# Patient Record
Sex: FEMALE | Race: WHITE | Employment: UNEMPLOYED | URBAN - METROPOLITAN AREA
[De-identification: names, ages, dates, MRNs, and addresses within clinical notes are randomized per-mention and may not be internally consistent; named-entity substitution may affect disease eponyms.]

---

## 2022-11-24 ENCOUNTER — APPOINTMENT (OUTPATIENT)
Dept: CT IMAGING | Age: 82
DRG: 063 | End: 2022-11-24
Attending: EMERGENCY MEDICINE
Payer: MEDICARE

## 2022-11-24 ENCOUNTER — HOSPITAL ENCOUNTER (INPATIENT)
Age: 82
LOS: 3 days | Discharge: HOME | DRG: 063 | End: 2022-11-27
Attending: EMERGENCY MEDICINE | Admitting: INTERNAL MEDICINE
Payer: MEDICARE

## 2022-11-24 DIAGNOSIS — E78.5 DYSLIPIDEMIA: ICD-10-CM

## 2022-11-24 DIAGNOSIS — R29.898 ARM WEAKNESS: Primary | ICD-10-CM

## 2022-11-24 DIAGNOSIS — I63.312 CEREBROVASCULAR ACCIDENT (CVA) DUE TO THROMBOSIS OF LEFT MIDDLE CEREBRAL ARTERY (HCC): ICD-10-CM

## 2022-11-24 PROBLEM — I63.9 STROKE (HCC): Status: ACTIVE | Noted: 2022-11-24

## 2022-11-24 LAB
ALBUMIN SERPL-MCNC: 3.9 G/DL (ref 3.5–5)
ALBUMIN/GLOB SERPL: 1.1 {RATIO} (ref 1.1–2.2)
ALP SERPL-CCNC: 92 U/L (ref 45–117)
ALT SERPL-CCNC: 25 U/L (ref 12–78)
AMPHET UR QL SCN: NEGATIVE
ANION GAP SERPL CALC-SCNC: 7 MMOL/L (ref 5–15)
APPEARANCE UR: CLEAR
AST SERPL-CCNC: 26 U/L (ref 15–37)
BACTERIA URNS QL MICRO: NEGATIVE /HPF
BARBITURATES UR QL SCN: NEGATIVE
BASOPHILS # BLD: 0 K/UL (ref 0–0.1)
BASOPHILS NFR BLD: 0 % (ref 0–1)
BENZODIAZ UR QL: NEGATIVE
BILIRUB SERPL-MCNC: 0.3 MG/DL (ref 0.2–1)
BILIRUB UR QL: NEGATIVE
BUN SERPL-MCNC: 16 MG/DL (ref 6–20)
BUN/CREAT SERPL: 18 (ref 12–20)
CALCIUM SERPL-MCNC: 9.9 MG/DL (ref 8.5–10.1)
CANNABINOIDS UR QL SCN: NEGATIVE
CHLORIDE SERPL-SCNC: 100 MMOL/L (ref 97–108)
CO2 SERPL-SCNC: 28 MMOL/L (ref 21–32)
COCAINE UR QL SCN: NEGATIVE
COLOR UR: ABNORMAL
COMMENT, HOLDF: NORMAL
CREAT SERPL-MCNC: 0.88 MG/DL (ref 0.55–1.02)
DIFFERENTIAL METHOD BLD: NORMAL
DRUG SCRN COMMENT,DRGCM: NORMAL
EOSINOPHIL # BLD: 0.2 K/UL (ref 0–0.4)
EOSINOPHIL NFR BLD: 2 % (ref 0–7)
EPITH CASTS URNS QL MICRO: ABNORMAL /LPF
ERYTHROCYTE [DISTWIDTH] IN BLOOD BY AUTOMATED COUNT: 12.2 % (ref 11.5–14.5)
ETHANOL SERPL-MCNC: <10 MG/DL
GLOBULIN SER CALC-MCNC: 3.6 G/DL (ref 2–4)
GLUCOSE BLD STRIP.AUTO-MCNC: 113 MG/DL (ref 65–117)
GLUCOSE SERPL-MCNC: 98 MG/DL (ref 65–100)
GLUCOSE UR STRIP.AUTO-MCNC: NEGATIVE MG/DL
HCT VFR BLD AUTO: 38.7 % (ref 35–47)
HGB BLD-MCNC: 13.3 G/DL (ref 11.5–16)
HGB UR QL STRIP: NEGATIVE
HYALINE CASTS URNS QL MICRO: ABNORMAL /LPF (ref 0–2)
IMM GRANULOCYTES # BLD AUTO: 0 K/UL (ref 0–0.04)
IMM GRANULOCYTES NFR BLD AUTO: 0 % (ref 0–0.5)
INR PPP: 0.9 (ref 0.9–1.1)
KETONES UR QL STRIP.AUTO: NEGATIVE MG/DL
LEUKOCYTE ESTERASE UR QL STRIP.AUTO: ABNORMAL
LYMPHOCYTES # BLD: 3.3 K/UL (ref 0.8–3.5)
LYMPHOCYTES NFR BLD: 44 % (ref 12–49)
MCH RBC QN AUTO: 31.7 PG (ref 26–34)
MCHC RBC AUTO-ENTMCNC: 34.4 G/DL (ref 30–36.5)
MCV RBC AUTO: 92.1 FL (ref 80–99)
METHADONE UR QL: NEGATIVE
MONOCYTES # BLD: 0.8 K/UL (ref 0–1)
MONOCYTES NFR BLD: 10 % (ref 5–13)
NEUTS SEG # BLD: 3.3 K/UL (ref 1.8–8)
NEUTS SEG NFR BLD: 44 % (ref 32–75)
NITRITE UR QL STRIP.AUTO: NEGATIVE
NRBC # BLD: 0 K/UL (ref 0–0.01)
NRBC BLD-RTO: 0 PER 100 WBC
OPIATES UR QL: NEGATIVE
PCP UR QL: NEGATIVE
PH UR STRIP: 6.5 [PH] (ref 5–8)
PLATELET # BLD AUTO: 304 K/UL (ref 150–400)
PMV BLD AUTO: 9.9 FL (ref 8.9–12.9)
POTASSIUM SERPL-SCNC: 3.4 MMOL/L (ref 3.5–5.1)
PROT SERPL-MCNC: 7.5 G/DL (ref 6.4–8.2)
PROT UR STRIP-MCNC: NEGATIVE MG/DL
PROTHROMBIN TIME: 9.8 SEC (ref 9–11.1)
RBC # BLD AUTO: 4.2 M/UL (ref 3.8–5.2)
RBC #/AREA URNS HPF: ABNORMAL /HPF (ref 0–5)
SAMPLES BEING HELD,HOLD: NORMAL
SERVICE CMNT-IMP: NORMAL
SODIUM SERPL-SCNC: 135 MMOL/L (ref 136–145)
SP GR UR REFRACTOMETRY: 1.02
TSH SERPL DL<=0.05 MIU/L-ACNC: 21.7 UIU/ML (ref 0.36–3.74)
UA: UC IF INDICATED,UAUC: ABNORMAL
UROBILINOGEN UR QL STRIP.AUTO: 0.2 EU/DL (ref 0.2–1)
WBC # BLD AUTO: 7.5 K/UL (ref 3.6–11)
WBC URNS QL MICRO: ABNORMAL /HPF (ref 0–4)

## 2022-11-24 PROCEDURE — 96374 THER/PROPH/DIAG INJ IV PUSH: CPT

## 2022-11-24 PROCEDURE — 70496 CT ANGIOGRAPHY HEAD: CPT

## 2022-11-24 PROCEDURE — 74011250636 HC RX REV CODE- 250/636: Performed by: NURSE PRACTITIONER

## 2022-11-24 PROCEDURE — 82962 GLUCOSE BLOOD TEST: CPT

## 2022-11-24 PROCEDURE — 74011250637 HC RX REV CODE- 250/637: Performed by: NURSE PRACTITIONER

## 2022-11-24 PROCEDURE — 81001 URINALYSIS AUTO W/SCOPE: CPT

## 2022-11-24 PROCEDURE — 74011000250 HC RX REV CODE- 250: Performed by: EMERGENCY MEDICINE

## 2022-11-24 PROCEDURE — 0042T CT CODE NEURO PERF W CBF: CPT

## 2022-11-24 PROCEDURE — 84443 ASSAY THYROID STIM HORMONE: CPT

## 2022-11-24 PROCEDURE — 80307 DRUG TEST PRSMV CHEM ANLYZR: CPT

## 2022-11-24 PROCEDURE — 99285 EMERGENCY DEPT VISIT HI MDM: CPT

## 2022-11-24 PROCEDURE — 85025 COMPLETE CBC W/AUTO DIFF WBC: CPT

## 2022-11-24 PROCEDURE — 36415 COLL VENOUS BLD VENIPUNCTURE: CPT

## 2022-11-24 PROCEDURE — 80053 COMPREHEN METABOLIC PANEL: CPT

## 2022-11-24 PROCEDURE — 82077 ASSAY SPEC XCP UR&BREATH IA: CPT

## 2022-11-24 PROCEDURE — 3E03317 INTRODUCTION OF OTHER THROMBOLYTIC INTO PERIPHERAL VEIN, PERCUTANEOUS APPROACH: ICD-10-PCS | Performed by: EMERGENCY MEDICINE

## 2022-11-24 PROCEDURE — 74011000636 HC RX REV CODE- 636: Performed by: EMERGENCY MEDICINE

## 2022-11-24 PROCEDURE — 70450 CT HEAD/BRAIN W/O DYE: CPT

## 2022-11-24 PROCEDURE — 85610 PROTHROMBIN TIME: CPT

## 2022-11-24 PROCEDURE — 4A03X5D MEASUREMENT OF ARTERIAL FLOW, INTRACRANIAL, EXTERNAL APPROACH: ICD-10-PCS | Performed by: RADIOLOGY

## 2022-11-24 PROCEDURE — 65610000006 HC RM INTENSIVE CARE

## 2022-11-24 PROCEDURE — 74011000250 HC RX REV CODE- 250

## 2022-11-24 PROCEDURE — 74011250636 HC RX REV CODE- 250/636

## 2022-11-24 PROCEDURE — 93005 ELECTROCARDIOGRAM TRACING: CPT

## 2022-11-24 RX ORDER — HEPARIN SODIUM 5000 [USP'U]/ML
5000 INJECTION, SOLUTION INTRAVENOUS; SUBCUTANEOUS EVERY 8 HOURS
Status: DISCONTINUED | OUTPATIENT
Start: 2022-11-24 | End: 2022-11-25

## 2022-11-24 RX ORDER — SODIUM CHLORIDE 0.9 % (FLUSH) 0.9 %
10 SYRINGE (ML) INJECTION ONCE
Status: COMPLETED | OUTPATIENT
Start: 2022-11-24 | End: 2022-11-24

## 2022-11-24 RX ORDER — LABETALOL HYDROCHLORIDE 5 MG/ML
INJECTION, SOLUTION INTRAVENOUS
Status: COMPLETED
Start: 2022-11-24 | End: 2022-11-24

## 2022-11-24 RX ORDER — LEVOTHYROXINE SODIUM 50 UG/1
50 TABLET ORAL
Status: DISCONTINUED | OUTPATIENT
Start: 2022-11-25 | End: 2022-11-26

## 2022-11-24 RX ORDER — METOPROLOL TARTRATE 5 MG/5ML
10 INJECTION INTRAVENOUS ONCE
Status: ACTIVE | OUTPATIENT
Start: 2022-11-24 | End: 2022-11-25

## 2022-11-24 RX ORDER — ACETAMINOPHEN 325 MG/1
650 TABLET ORAL
Status: DISCONTINUED | OUTPATIENT
Start: 2022-11-24 | End: 2022-11-27 | Stop reason: HOSPADM

## 2022-11-24 RX ORDER — GUAIFENESIN 100 MG/5ML
81 LIQUID (ML) ORAL DAILY
Status: DISCONTINUED | OUTPATIENT
Start: 2022-11-25 | End: 2022-11-27 | Stop reason: HOSPADM

## 2022-11-24 RX ORDER — LABETALOL HYDROCHLORIDE 5 MG/ML
10 INJECTION, SOLUTION INTRAVENOUS
Status: DISCONTINUED | OUTPATIENT
Start: 2022-11-24 | End: 2022-11-27 | Stop reason: HOSPADM

## 2022-11-24 RX ORDER — POTASSIUM CHLORIDE 7.45 MG/ML
10 INJECTION INTRAVENOUS
Status: COMPLETED | OUTPATIENT
Start: 2022-11-24 | End: 2022-11-24

## 2022-11-24 RX ORDER — LORAZEPAM 0.5 MG/1
0.5 TABLET ORAL ONCE
Status: COMPLETED | OUTPATIENT
Start: 2022-11-24 | End: 2022-11-24

## 2022-11-24 RX ADMIN — IOPAMIDOL 100 ML: 755 INJECTION, SOLUTION INTRAVENOUS at 18:36

## 2022-11-24 RX ADMIN — Medication 13 MG: at 18:21

## 2022-11-24 RX ADMIN — POTASSIUM CHLORIDE 10 MEQ: 10 INJECTION, SOLUTION INTRAVENOUS at 22:00

## 2022-11-24 RX ADMIN — POTASSIUM CHLORIDE 10 MEQ: 10 INJECTION, SOLUTION INTRAVENOUS at 23:00

## 2022-11-24 RX ADMIN — LORAZEPAM 0.5 MG: 0.5 TABLET ORAL at 22:46

## 2022-11-24 RX ADMIN — SODIUM CHLORIDE, PRESERVATIVE FREE 10 ML: 5 INJECTION INTRAVENOUS at 18:55

## 2022-11-24 RX ADMIN — LABETALOL HYDROCHLORIDE 10 MG: 5 INJECTION INTRAVENOUS at 18:17

## 2022-11-24 RX ADMIN — LABETALOL HYDROCHLORIDE: 5 INJECTION, SOLUTION INTRAVENOUS at 18:19

## 2022-11-24 RX ADMIN — SODIUM CHLORIDE, PRESERVATIVE FREE 10 ML: 5 INJECTION INTRAVENOUS at 18:54

## 2022-11-24 NOTE — ED PROVIDER NOTES
EMERGENCY DEPARTMENT HISTORY AND PHYSICAL EXAM      Date: 11/24/2022  Patient Name: Willie Valenzuela    History of Presenting Illness     Chief Complaint   Patient presents with    Hand Pain     Patient presents to triage ambulatory complaining of having a stroke. Patient reports she began to experience right hand pain while eating dinner. Patient is speaking in complete sentences, tongue is midline and speech is clear. Patient is using her right hand however reports it's numb and she is unable to feel her fingers. Patient used her right hand to remove her earrings from her ears and necklaces around her neck. Patient denies any headache, fever, chills or nausea/vomiting. History Provided By: Patient    HPI: Willie Valenzuela, 80 y.o. female with PMHx as noted below presents the emergency department for evaluation of right arm weakness. Patient is approximately 1 hour prior to arrival patient felt that her right hand was weaker. Patient notes that symptoms been constant since onset. Has no other symptoms or deficits. Denies any pain. Pt denies any other alleviating or exacerbating factors. Additionally, pt specifically denies any recent fever, chills, headache, nausea, vomiting, abdominal pain, CP, SOB, lightheadedness, dizziness, BLE swelling, heart palpitations, urinary sxs, diarrhea, constipation, melena, hematochezia, cough, or congestion.   PCP: None    Current Facility-Administered Medications   Medication Dose Route Frequency Provider Last Rate Last Admin    niCARdipine (CARDENE) 25 mg in 0.9% sodium chloride 250 mL (Aaof3Hal)  5-15 mg/hr IntraVENous TITRATE Eugenia Shin MD        labetaloL (NORMODYNE;TRANDATE) injection 10 mg  10 mg IntraVENous Q5MIN PRN Eugenia Shin MD   10 mg at 11/24/22 1817    metoprolol (LOPRESSOR) injection 10 mg  10 mg IntraVENous Chanelle Woo MD        labetaloL (NORMODYNE;TRANDATE) 5 mg/mL injection             acetaminophen (TYLENOL) tablet 650 mg  650 mg Oral Q4H PRN Ashok Mata NP        Or    acetaminophen (TYLENOL) solution 650 mg  650 mg Per NG tube Q4H PRN Ashok Mata NP        Or    acetaminophen (TYLENOL) suppository 650 mg  650 mg Rectal Q4H PRN Ashok Mata NP        [Held by provider] aspirin chewable tablet 81 mg  81 mg Oral DAILY Ashok Mata NP        [Held by provider] heparin (porcine) injection 5,000 Units  5,000 Units SubCUTAneous Q8H Ashok Mata NP        Tammie Ramirez ON 11/25/2022] levothyroxine (SYNTHROID) tablet 50 mcg  50 mcg Oral 6am Nicolas Larose, JORDY        potassium chloride 10 mEq in 100 ml IVPB  10 mEq IntraVENous Q1H Ashok Mata NP           Past History     Past Medical History:  htn    Past Surgical History:  No past surgical history on file. Family History:  No family history on file. Social History:  Alcohol socially, denies any illicit drugs    Allergies:  No Known Allergies      Review of Systems   Review of Systems  Constitutional: Negative for fever, chills, and fatigue. HENT: Negative for congestion, sore throat, rhinorrhea, sneezing and neck stiffness   Eyes: Negative for discharge and redness. Respiratory: Negative for  shortness of breath, wheezing   Cardiovascular: Negative for chest pain, palpitations   Gastrointestinal: Negative for nausea, vomiting, abdominal pain, constipation, diarrhea and blood in stool. Genitourinary: Negative for dysuria, urgency, frequency, hematuria, flank pain, decreased urine volume, discharge,   Musculoskeletal: Negative for myalgias or joint pain . Skin: Negative for rash or lesions . Neurological: Positive numbness, weakness. Negative headaches. Physical Exam   Physical Exam    GENERAL: alert and oriented, no acute distress  EYES: PEERL, No injection, discharge or icterus. ENT: Mucous membranes pink and moist.  NECK: Supple  LUNGS: Airway patent. Non-labored respirations.  Breath sounds clear with good air entry bilaterally. HEART: Regular rate and rhythm. No peripheral edema  ABDOMEN: Non-distended and non-tender, without guarding or rebound. SKIN:  warm, dry  EXTREMITIES: Without swelling, tenderness or deformity, symmetric with normal ROM  NEUROLOGICAL: Alert, oriented cranial nerves II through XII grossly intact, right upper extremity 4/5, left upper extremity 5/5 strength, bilateral lower extremities 5/5. Sensation intact and equal throughout to light touch. Diagnostic Study Results     Labs -     Recent Results (from the past 12 hour(s))   GLUCOSE, POC    Collection Time: 11/24/22  5:56 PM   Result Value Ref Range    Glucose (POC) 113 65 - 117 mg/dL    Performed by Luisito Felipe    CBC WITH AUTOMATED DIFF    Collection Time: 11/24/22  6:25 PM   Result Value Ref Range    WBC 7.5 3.6 - 11.0 K/uL    RBC 4.20 3.80 - 5.20 M/uL    HGB 13.3 11.5 - 16.0 g/dL    HCT 38.7 35.0 - 47.0 %    MCV 92.1 80.0 - 99.0 FL    MCH 31.7 26.0 - 34.0 PG    MCHC 34.4 30.0 - 36.5 g/dL    RDW 12.2 11.5 - 14.5 %    PLATELET 076 317 - 484 K/uL    MPV 9.9 8.9 - 12.9 FL    NRBC 0.0 0  WBC    ABSOLUTE NRBC 0.00 0.00 - 0.01 K/uL    NEUTROPHILS 44 32 - 75 %    LYMPHOCYTES 44 12 - 49 %    MONOCYTES 10 5 - 13 %    EOSINOPHILS 2 0 - 7 %    BASOPHILS 0 0 - 1 %    IMMATURE GRANULOCYTES 0 0.0 - 0.5 %    ABS. NEUTROPHILS 3.3 1.8 - 8.0 K/UL    ABS. LYMPHOCYTES 3.3 0.8 - 3.5 K/UL    ABS. MONOCYTES 0.8 0.0 - 1.0 K/UL    ABS. EOSINOPHILS 0.2 0.0 - 0.4 K/UL    ABS. BASOPHILS 0.0 0.0 - 0.1 K/UL    ABS. IMM.  GRANS. 0.0 0.00 - 0.04 K/UL    DF AUTOMATED     METABOLIC PANEL, COMPREHENSIVE    Collection Time: 11/24/22  6:25 PM   Result Value Ref Range    Sodium 135 (L) 136 - 145 mmol/L    Potassium 3.4 (L) 3.5 - 5.1 mmol/L    Chloride 100 97 - 108 mmol/L    CO2 28 21 - 32 mmol/L    Anion gap 7 5 - 15 mmol/L    Glucose 98 65 - 100 mg/dL    BUN 16 6 - 20 MG/DL    Creatinine 0.88 0.55 - 1.02 MG/DL    BUN/Creatinine ratio 18 12 - 20      eGFR >60 >60 ml/min/1.73m2    Calcium 9.9 8.5 - 10.1 MG/DL    Bilirubin, total 0.3 0.2 - 1.0 MG/DL    ALT (SGPT) 25 12 - 78 U/L    AST (SGOT) 26 15 - 37 U/L    Alk. phosphatase 92 45 - 117 U/L    Protein, total 7.5 6.4 - 8.2 g/dL    Albumin 3.9 3.5 - 5.0 g/dL    Globulin 3.6 2.0 - 4.0 g/dL    A-G Ratio 1.1 1.1 - 2.2     PROTHROMBIN TIME + INR    Collection Time: 11/24/22  6:25 PM   Result Value Ref Range    INR 0.9 0.9 - 1.1      Prothrombin time 9.8 9.0 - 11.1 sec   SAMPLES BEING HELD    Collection Time: 11/24/22  6:25 PM   Result Value Ref Range    SAMPLES BEING HELD 1GRN 1PST     COMMENT        Add-on orders for these samples will be processed based on acceptable specimen integrity and analyte stability, which may vary by analyte. Radiologic Studies -   CTA CODE NEURO HEAD AND NECK W CONT   Final Result   1. No acute large vessel occlusion. Diminutive vertebrobasilar system with   patent posterior communicating arteries as above. 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 2.  Moderate stenoses bilateral internal carotid artery origins. 3. No perfusion abnormality. If high clinical concern for acute infarction   consider MRI. 4. Extradural calcifications as above likely represent meningiomas. CT CODE NEURO PERF W CBF   Final Result   1. No acute large vessel occlusion. Diminutive vertebrobasilar system with   patent posterior communicating arteries as above. 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 2.  Moderate stenoses bilateral internal carotid artery origins. 3. No perfusion abnormality. If high clinical concern for acute infarction   consider MRI. 4. Extradural calcifications as above likely represent meningiomas. CT CODE NEURO HEAD WO CONTRAST   Final Result   1. No evidence of acute infarct or intracranial hemorrhage.    2. Mild periventricular white matter disease is likely secondary to chronic small vessel ischemic changes. CT HEAD WO CONT    (Results Pending)   MRI BRAIN W WO CONT    (Results Pending)         CXR Results  (Last 48 hours)      None              Medical Decision Making     I, Fransisco Martins MD am the first provider for this patient and am the attending of record for this patient encounter. I reviewed the vital signs, available nursing notes, past medical history, past surgical history, family history and social history. Vital Signs-Reviewed the patient's vital signs. Patient Vitals for the past 12 hrs:   Temp Pulse Resp BP SpO2   11/24/22 1915 -- 62 20 (!) 149/68 97 %   11/24/22 1906 98.9 °F (37.2 °C) -- -- -- --   11/24/22 1900 -- 61 15 (!) 161/73 96 %   11/24/22 1852 -- 64 14 133/69 97 %   11/24/22 1845 -- 69 15 (!) 183/75 97 %   11/24/22 1819 -- 70 18 (!) 167/77 98 %   11/24/22 1817 -- 76 15 (!) 216/93 97 %   11/24/22 1815 -- 77 17 (!) 216/93 97 %   11/24/22 1739 -- 73 16 (!) 163/123 98 %         Records Reviewed: Nursing Notes and Old Medical Records    Provider Notes (Medical Decision Making): On presentation, the patient is well appearing, in no acute distress initially hypertensive. Based on onset made level 1 stroke alert, taken to CT scan where they had no acute findings on head scan. Evaluated in conjunction with teleneurology. They recommended giving the patient TNK however her blood pressure at that time was significantly elevated above the threshold to safely give the medications and required treatment with labetalol and subsequent nicardipine to lower blood pressure to safe range to administer the medication. TNK decision was also delayed as patient did not want to make decision without first consulting with her . Patient was then admitted to the intensive care unit for further management. ED Course:   *Called at 1740 to assess patient in triage. Initial assessment performed.  The patients presenting problems have been discussed, and they are in agreement with the care plan formulated and outlined with them. I have encouraged them to ask questions as they arise throughout their visit. PROGRESS:  The patient has been re-evaluated and sx have improved. Reviewed available results with patient and have counseled them on diagnosis and care plan. They have expressed understanding, and all their questions have been answered. They agree with plan for admission. Admit Note  Patient is being admitted to the intensivist the results of their tests and reasons for their admission have been discussed with them and/or available family. They convey agreement and understanding for the need to be admitted and for their admission diagnosis. Consultation has been made with the inpatient physician specialist for hospitalization. Critical Care Note      IMPENDING DETERIORATION -CNS  ASSOCIATED RISK FACTORS - CNS Decompensation  MANAGEMENT- Bedside Assessment and Supervision of Care  INTERPRETATION -  CT Scan and Blood Pressure  INTERVENTIONS - hemodynamic mngmt and Neurologic interventions   CASE REVIEW - Hospitalist/Intensivist  TREATMENT RESPONSE -Improved  PERFORMED BY - Self    NOTES   :  I have provided a total of 45 minutes of critical time not including time spent on separately documented procedures. The reason for providing this level of medical care for this critically ill patient was due to a critical illness that impaired one or more vital organ systems such that there was a high probability of imminent or life threatening deterioration in the patients condition. This care involved high complexity decision making to assess, manipulate, and support vital system functions,  lab review, consultations with specialist, family decision- making, bedside attention and documentation. During this entire length of time I was immediately available to the patient      Disposition:  admission    PLAN:  1.  Admit     Diagnosis     Clinical Impression: 1. Arm weakness        Please note that this dictation was completed with Dragon, computer voice recognition software. Quite often unanticipated grammatical, syntax, homophones, and other interpretive errors are inadvertently transcribed by the computer software. Please disregard these errors. Additionally, please excuse any errors that have escaped final proofreading.

## 2022-11-24 NOTE — ED NOTES
Pt presents to the er per her son with complaints of numbness and weakness to the right arm about 1615 at home- he states she began to get the strength back but came to the ed to be evaluated. In ct scan I could not appreciate any deficits and documented a NIH of 0- her story was hard to follow. Pts family arrived back in ct and explained that some of her confusion is due possible diagnosis of bipolar. When mentioned around the patient she became very frustrated and states that diagnosis was a mistake.

## 2022-11-24 NOTE — Clinical Note
Status[de-identified] INPATIENT [101]   Type of Bed: Intensive Care [6]   Inpatient Hospitalization Certified Necessary for the Following Reasons: 4.  Patient requires ICU level of care interventions (further clarification in H&P documentation)   Admitting Diagnosis: Stroke [facility]) [[id number]]   Admitting Physician: Jyotsna Scruggs [[id number]]   Attending Physician: Jyotsna Scruggs [[id number]]   Estimated Length of Stay: 2 Midnights   Discharge Plan[de-identified] Home with Office Follow-up

## 2022-11-25 ENCOUNTER — APPOINTMENT (OUTPATIENT)
Dept: MRI IMAGING | Age: 82
DRG: 063 | End: 2022-11-25
Attending: NURSE PRACTITIONER
Payer: MEDICARE

## 2022-11-25 ENCOUNTER — APPOINTMENT (OUTPATIENT)
Dept: CT IMAGING | Age: 82
DRG: 063 | End: 2022-11-25
Attending: INTERNAL MEDICINE
Payer: MEDICARE

## 2022-11-25 ENCOUNTER — APPOINTMENT (OUTPATIENT)
Dept: CT IMAGING | Age: 82
DRG: 063 | End: 2022-11-25
Attending: NURSE PRACTITIONER
Payer: MEDICARE

## 2022-11-25 LAB
ANION GAP SERPL CALC-SCNC: 6 MMOL/L (ref 5–15)
ATRIAL RATE: 58 BPM
BASOPHILS # BLD: 0 K/UL (ref 0–0.1)
BASOPHILS NFR BLD: 0 % (ref 0–1)
BUN SERPL-MCNC: 13 MG/DL (ref 6–20)
BUN/CREAT SERPL: 18 (ref 12–20)
CALCIUM SERPL-MCNC: 9.1 MG/DL (ref 8.5–10.1)
CALCULATED P AXIS, ECG09: 78 DEGREES
CALCULATED R AXIS, ECG10: 49 DEGREES
CALCULATED T AXIS, ECG11: 77 DEGREES
CHLORIDE SERPL-SCNC: 102 MMOL/L (ref 97–108)
CHOLEST SERPL-MCNC: 186 MG/DL
CO2 SERPL-SCNC: 26 MMOL/L (ref 21–32)
CREAT SERPL-MCNC: 0.74 MG/DL (ref 0.55–1.02)
DIAGNOSIS, 93000: NORMAL
DIFFERENTIAL METHOD BLD: NORMAL
EOSINOPHIL # BLD: 0.1 K/UL (ref 0–0.4)
EOSINOPHIL NFR BLD: 1 % (ref 0–7)
ERYTHROCYTE [DISTWIDTH] IN BLOOD BY AUTOMATED COUNT: 12.1 % (ref 11.5–14.5)
EST. AVERAGE GLUCOSE BLD GHB EST-MCNC: 100 MG/DL
GLUCOSE SERPL-MCNC: 96 MG/DL (ref 65–100)
HBA1C MFR BLD: 5.1 % (ref 4–5.6)
HCT VFR BLD AUTO: 35.1 % (ref 35–47)
HDLC SERPL-MCNC: 61 MG/DL
HDLC SERPL: 3 {RATIO} (ref 0–5)
HGB BLD-MCNC: 12.5 G/DL (ref 11.5–16)
IMM GRANULOCYTES # BLD AUTO: 0 K/UL (ref 0–0.04)
IMM GRANULOCYTES NFR BLD AUTO: 0 % (ref 0–0.5)
LDLC SERPL CALC-MCNC: 112.6 MG/DL (ref 0–100)
LYMPHOCYTES # BLD: 2 K/UL (ref 0.8–3.5)
LYMPHOCYTES NFR BLD: 29 % (ref 12–49)
MAGNESIUM SERPL-MCNC: 2 MG/DL (ref 1.6–2.4)
MCH RBC QN AUTO: 32.2 PG (ref 26–34)
MCHC RBC AUTO-ENTMCNC: 35.6 G/DL (ref 30–36.5)
MCV RBC AUTO: 90.5 FL (ref 80–99)
MONOCYTES # BLD: 0.8 K/UL (ref 0–1)
MONOCYTES NFR BLD: 11 % (ref 5–13)
NEUTS SEG # BLD: 4 K/UL (ref 1.8–8)
NEUTS SEG NFR BLD: 59 % (ref 32–75)
NRBC # BLD: 0 K/UL (ref 0–0.01)
NRBC BLD-RTO: 0 PER 100 WBC
P-R INTERVAL, ECG05: 122 MS
PHOSPHATE SERPL-MCNC: 3.7 MG/DL (ref 2.6–4.7)
PLATELET # BLD AUTO: 243 K/UL (ref 150–400)
PMV BLD AUTO: 9.6 FL (ref 8.9–12.9)
POTASSIUM SERPL-SCNC: 4.1 MMOL/L (ref 3.5–5.1)
Q-T INTERVAL, ECG07: 462 MS
QRS DURATION, ECG06: 100 MS
QTC CALCULATION (BEZET), ECG08: 453 MS
RBC # BLD AUTO: 3.88 M/UL (ref 3.8–5.2)
SODIUM SERPL-SCNC: 134 MMOL/L (ref 136–145)
T3FREE SERPL-MCNC: 1.8 PG/ML (ref 2.2–4)
T4 FREE SERPL-MCNC: 0.9 NG/DL (ref 0.8–1.5)
TRIGL SERPL-MCNC: 62 MG/DL (ref ?–150)
VENTRICULAR RATE, ECG03: 58 BPM
VLDLC SERPL CALC-MCNC: 12.4 MG/DL
WBC # BLD AUTO: 7 K/UL (ref 3.6–11)

## 2022-11-25 PROCEDURE — 74011250637 HC RX REV CODE- 250/637: Performed by: INTERNAL MEDICINE

## 2022-11-25 PROCEDURE — 92610 EVALUATE SWALLOWING FUNCTION: CPT

## 2022-11-25 PROCEDURE — 83735 ASSAY OF MAGNESIUM: CPT

## 2022-11-25 PROCEDURE — 70551 MRI BRAIN STEM W/O DYE: CPT

## 2022-11-25 PROCEDURE — 83036 HEMOGLOBIN GLYCOSYLATED A1C: CPT

## 2022-11-25 PROCEDURE — 99291 CRITICAL CARE FIRST HOUR: CPT | Performed by: INTERNAL MEDICINE

## 2022-11-25 PROCEDURE — 80061 LIPID PANEL: CPT

## 2022-11-25 PROCEDURE — 80048 BASIC METABOLIC PNL TOTAL CA: CPT

## 2022-11-25 PROCEDURE — 74011250636 HC RX REV CODE- 250/636: Performed by: EMERGENCY MEDICINE

## 2022-11-25 PROCEDURE — 74011000250 HC RX REV CODE- 250: Performed by: EMERGENCY MEDICINE

## 2022-11-25 PROCEDURE — 70450 CT HEAD/BRAIN W/O DYE: CPT

## 2022-11-25 PROCEDURE — 74011250636 HC RX REV CODE- 250/636: Performed by: INTERNAL MEDICINE

## 2022-11-25 PROCEDURE — 84439 ASSAY OF FREE THYROXINE: CPT

## 2022-11-25 PROCEDURE — 74011000250 HC RX REV CODE- 250: Performed by: INTERNAL MEDICINE

## 2022-11-25 PROCEDURE — 84100 ASSAY OF PHOSPHORUS: CPT

## 2022-11-25 PROCEDURE — A9576 INJ PROHANCE MULTIPACK: HCPCS | Performed by: INTERNAL MEDICINE

## 2022-11-25 PROCEDURE — 85025 COMPLETE CBC W/AUTO DIFF WBC: CPT

## 2022-11-25 PROCEDURE — 36415 COLL VENOUS BLD VENIPUNCTURE: CPT

## 2022-11-25 PROCEDURE — 84481 FREE ASSAY (FT-3): CPT

## 2022-11-25 PROCEDURE — 65270000046 HC RM TELEMETRY

## 2022-11-25 PROCEDURE — 74011250637 HC RX REV CODE- 250/637: Performed by: STUDENT IN AN ORGANIZED HEALTH CARE EDUCATION/TRAINING PROGRAM

## 2022-11-25 PROCEDURE — 74011250637 HC RX REV CODE- 250/637: Performed by: NURSE PRACTITIONER

## 2022-11-25 RX ORDER — DIAZEPAM 2 MG/1
2 TABLET ORAL ONCE
Status: COMPLETED | OUTPATIENT
Start: 2022-11-25 | End: 2022-11-25

## 2022-11-25 RX ORDER — METOPROLOL TARTRATE 25 MG/1
25 TABLET, FILM COATED ORAL EVERY 12 HOURS
Status: DISCONTINUED | OUTPATIENT
Start: 2022-11-25 | End: 2022-11-27 | Stop reason: HOSPADM

## 2022-11-25 RX ORDER — HYDRALAZINE HYDROCHLORIDE 20 MG/ML
20 INJECTION INTRAMUSCULAR; INTRAVENOUS
Status: DISCONTINUED | OUTPATIENT
Start: 2022-11-25 | End: 2022-11-25

## 2022-11-25 RX ORDER — HYDRALAZINE HYDROCHLORIDE 20 MG/ML
20 INJECTION INTRAMUSCULAR; INTRAVENOUS ONCE
Status: COMPLETED | OUTPATIENT
Start: 2022-11-25 | End: 2022-11-25

## 2022-11-25 RX ORDER — ATORVASTATIN CALCIUM 40 MG/1
40 TABLET, FILM COATED ORAL
Status: DISCONTINUED | OUTPATIENT
Start: 2022-11-25 | End: 2022-11-27 | Stop reason: HOSPADM

## 2022-11-25 RX ORDER — ENOXAPARIN SODIUM 100 MG/ML
30 INJECTION SUBCUTANEOUS EVERY 24 HOURS
Status: DISCONTINUED | OUTPATIENT
Start: 2022-11-26 | End: 2022-11-27 | Stop reason: HOSPADM

## 2022-11-25 RX ADMIN — HYDRALAZINE HYDROCHLORIDE 20 MG: 20 INJECTION INTRAMUSCULAR; INTRAVENOUS at 10:57

## 2022-11-25 RX ADMIN — LEVOTHYROXINE SODIUM 50 MCG: 0.05 TABLET ORAL at 06:44

## 2022-11-25 RX ADMIN — SODIUM CHLORIDE 5 MG/HR: 9 INJECTION, SOLUTION INTRAVENOUS at 12:05

## 2022-11-25 RX ADMIN — METOPROLOL TARTRATE 25 MG: 25 TABLET, FILM COATED ORAL at 14:34

## 2022-11-25 RX ADMIN — FAMOTIDINE 20 MG: 10 INJECTION, SOLUTION INTRAVENOUS at 12:05

## 2022-11-25 RX ADMIN — LABETALOL HYDROCHLORIDE 10 MG: 5 INJECTION INTRAVENOUS at 23:51

## 2022-11-25 RX ADMIN — DIAZEPAM 2 MG: 2 TABLET ORAL at 12:06

## 2022-11-25 RX ADMIN — ATORVASTATIN CALCIUM 40 MG: 40 TABLET, FILM COATED ORAL at 22:45

## 2022-11-25 RX ADMIN — METOPROLOL TARTRATE 25 MG: 25 TABLET, FILM COATED ORAL at 22:45

## 2022-11-25 NOTE — PROGRESS NOTES
Physical Therapy    Received PT order. Pt adm with RUE weakness/numbness; high BP but was controlled and TNK given at 1620 yesterday; Aware of guidelines for PT intervention possibly with clearance at 12 hrs but pt is still in the ED awaiting CCU bed. Will defer at this time until pt in CCU with adequate support to be safely monitored during evaluation. Will follow.     Maciej Reyesn PT

## 2022-11-25 NOTE — PROGRESS NOTES
[facility] INITIAL ASSESSMENT. Chief Complaint   Patient presents with    Hand Pain       Patient presents to triage ambulatory complaining of having a stroke. Patient reports she began to experience right hand pain while eating dinner. Patient is speaking in complete sentences, tongue is midline and speech is clear. Patient is using her right hand however reports it's numb and she is unable to feel her fingers. Patient used her right hand to remove her earrings from her ears and necklaces around her neck. Patient denies any headache, fever, chills or nausea/vomiting. HPI:      81 y/o with pmh of hypothyroidism and HTN presented for right upper extremity weakness started at 1615. Upon arrival to the ED, weakness had resolved. Pt a little confused but admitted to having some wine. Stroke code activated,  CTH negative for bleed. CTA did not show a LVO. Tele neuro recommended TNK. However, /93. Given 10mg labetalol with improvement in BP and TNK given at 1820. ICU consulted for admission. Active Problems Being Managed:      Stroke symptoms s/p TNK in ED  HTN  Hypothyroidism     Assessment/Plan:      Stroke symptoms s/p TNK in ED  -s/p TNK at 200  -NIH stroke assessment per post TNK policy  -head ct 24 hours post TNK or sooner if s/o neurological deterioration  -no antiplatelets or anticoagulant medications for 24 hours post TNK and after follow up head CT negative for bleed, after that time start daily aspirin and sub Q heparin   -neurology consult  -MRI  -send lipid panel and HgA1c     HTN  - restart home meds  - PRN Hydral / lopressor for goal systolic <159  Home meds were brought to hospital. She has a 12.5/20 lisinopril/hctz, 20mg lisinopril in her daily box and 50mg metop in the as needed box. She has been taking it for sleep?      Hypothyroidism  -restart home synthroid (patient stopped taking it because \"it was making me too chatty)  -send TSH     DVT prophylaxis: SCDs  SUP: n/a  Code status: Full     Next of kin: Orin Thomas (spouse)   * of note, the patient takes valium at home PRN. She does not know the dose. She has severe anxiety and PTSD  I personally spent NA minutes of critical care time. This is time spent at this critically ill patient's bedside actively involved in patient care as well as the coordination of care and discussions with the patient's family. This does not include any procedural time which has been billed separately. Review of systems:      Review of Systems   Constitutional:  Negative for chills and fever. Eyes:  Negative for blurred vision and double vision. Respiratory:  Negative for cough. Cardiovascular:  Negative for chest pain and palpitations. Gastrointestinal:  Negative for abdominal pain, heartburn, nausea and vomiting. Genitourinary:  Negative for dysuria and urgency. Musculoskeletal:  Negative for myalgias. Neurological:  Negative for dizziness and headaches. Objective:      Vital Signs:  Visit Vitals  BP (!) 149/68   Pulse 62   Temp 98.9 °F (37.2 °C)   Resp 20   Ht 5' (1.524 m)   Wt 51.7 kg (113 lb 15.7 oz)   SpO2 97%   BMI 22.26 kg/m²      O2 Device: None (Room air) Temp (24hrs), Av.9 °F (37.2 °C), Min:98.9 °F (37.2 °C), Max:98.9 °F (37.2 °C)             Intake/Output:   No intake or output data in the 24 hours ending 22     Physical Exam  Constitutional:       General: She is not in acute distress. HENT:      Head: Normocephalic. Nose: Nose normal.      Mouth/Throat:      Mouth: Mucous membranes are moist.   Eyes:      Extraocular Movements: Extraocular movements intact. Conjunctiva/sclera: Conjunctivae normal.      Pupils: Pupils are equal, round, and reactive to light. Cardiovascular:      Rate and Rhythm: Normal rate and regular rhythm. Pulses: Normal pulses. Heart sounds: Normal heart sounds.    Pulmonary:      Effort: Pulmonary effort is normal. Breath sounds: Normal breath sounds. Abdominal:      General: Abdomen is flat. There is no distension. Palpations: Abdomen is soft. Tenderness: There is no abdominal tenderness. Musculoskeletal:         General: Normal range of motion. Cervical back: Normal range of motion. Skin:     General: Skin is warm. Capillary Refill: Capillary refill takes less than 2 seconds. Neurological:      General: No focal deficit present. Mental Status: She is alert and oriented to person, place, and time. Cranial Nerves: No cranial nerve deficit. Motor: No weakness. Past Medical History:          has no past medical history on file. Past Surgical History:       has no past surgical history on file. Home Medications:      Prior to Admission medications    Not on File            Allergies/Social/Family History:      No Known Allergies   Social History           Tobacco Use    Smoking status: Not on file    Smokeless tobacco: Not on file   Substance Use Topics    Alcohol use: Not on file      No family history on file. LABS AND  DATA:   Reviewed        Peak airway pressure:      Minute ventilation:         CRITICAL CARE CONSULTANT NOTE  I had a face to face encounter with the patient, reviewed and interpreted patient data including clinical events, labs, images, vital signs, I/O's, and examined patient. I have discussed the case and the plan and management of the patient's care with the consulting services, the bedside nurses and the respiratory therapist.       NOTE OF PERSONAL INVOLVEMENT IN CARE   This patient has a high probability of imminent, clinically significant deterioration, which requires the highest level of preparedness to intervene urgently.  I participated in the decision-making and personally managed or directed the management of the following life and organ supporting interventions that required my frequent assessment to treat or prevent imminent deterioration.         Mariano Cardona  [phone]

## 2022-11-25 NOTE — CONSULTS
Date of Consultation:  November 25, 2022    Referring Physician: Venus Catherine MD    Reason for Consultation: Acute stroke s/p TNKase    Chief Complaint   Patient presents with    Hand Pain     Patient presents to triage ambulatory complaining of having a stroke. Patient reports she began to experience right hand pain while eating dinner. Patient is speaking in complete sentences, tongue is midline and speech is clear. Patient is using her right hand however reports it's numb and she is unable to feel her fingers. Patient used her right hand to remove her earrings from her ears and necklaces around her neck. Patient denies any headache, fever, chills or nausea/vomiting. History of Present Illness:   Braden Lobo is a 80 y.o. female with history of hypertension, hypothyroidism, hyperlipidemia who presents to the ED after having right hand clumsiness, paresthesias and weakness while eating dinner. She states that her right hand was difficult to use and she had trouble coordinating her movements and grabbing objects. She did not have any weakness in her right leg or in her face. She denies slurred speech, difficulty speaking, headache, visual changes, sensory changes elsewhere in her body or other focal weakness. She denies any dizziness or trouble walking. On arrival to the emergency department she had head CT, CTA head and neck which did not show any acute infarct or LVO. Neuro telemetry was consulted and upon discussion with the patient and the family, Farhad Hurley was given at [phone] on 11/24 per medication administration record in epic. Currently, she feels like she is back to her baseline. She denies any weakness in her hand or numbness or tingling. She has no new neurological symptoms. She denies any headache. She does not take any aspirin at home and has never had anything like this happen to her before. No past medical history on file. No past surgical history on file.      No family history on file. Social History     Tobacco Use    Smoking status: Not on file    Smokeless tobacco: Not on file   Substance Use Topics    Alcohol use: Not on file        No Known Allergies     Prior to Admission medications    Not on File       Review of Systems:    General, constitutional: negative  Eyes, vision: negative  Ears, nose, throat: negative  Cardiovascular, heart: negative  Respiratory: negative  Gastrointestinal: negative  Genitourinary: negative  Musculoskeletal: negative  Skin and integumentary: negative  Psychiatric: negative  Endocrine: negative  Neurological: negative, except for HPI  Hematologic/lymphatic: negative  Allergy/immunology: negative    []Unable to obtain  ROS due to  []mental status change  []sedated   []intubated      PHYSICAL EXAMINATION:   Visit Vitals  BP (!) 200/93   Pulse 65   Temp 98.2 °F (36.8 °C)   Resp 21   Ht 5' (1.524 m)   Wt 113 lb 15.7 oz (51.7 kg)   SpO2 97%   BMI 22.26 kg/m²       Physical Exam:  General:  no acute distress  Neck: supple no mass   Lungs: Comfortable on room air  Heart: Well-perfused  Lower extremity: no edema    Neurological exam:  Mental Status: Awake, alert, oriented to person, place and time  Attention and Concentration: able to state the days of the week backwards   Speech and Language: No dysarthria. Able to name, repeat and follow commands   Fund of knowledge was preserved    Cranial nerves: II-XII:  Pupils equal and reactive, visual fields intact by confrontation   Extraocular movements intact, no evidence of nystagmus or ptosis   Facial sensation intact to light touch  Facial movements symmetric; no facial droop  Hearing intact to soft rub bilaterally   Shoulder shrug symmetric and strong   Tongue protrusion full and midline without fasciculation or atrophy    Motor:   Normal tone and Bulk   Drift: Mild right pronator drift  Finger tapping equal and symmetric      Strength testing:   deltoid triceps biceps Wrist ext. Wrist flex.  intrinsics Right 5 5 5 5 5 5   Left 5 5 5 5 5 5      Hip flex. Hip ext. Knee ext. Knee flex Dorsi flex Plantar flex   Right  5 5 5 5 5 5   Left  5 5 5 5 5 5       Sensory: Intact to light touch    Reflexes:   Biceps Triceps  Brachiorad Patellar Achilles Plantar Hoffmans   Right  2 2 2 2 2 Down NT   Left  2 2 2 2 2 Down NT     Cerebellar testing:  No dysmetria. Normal rapid alternating movements; finger-to-nose and heel-to- shin testing are within normal limits. Data:     Lab Results   Component Value Date/Time    Hemoglobin A1c 5.1 11/25/2022 02:57 AM    Sodium 134 (L) 11/25/2022 02:57 AM    Potassium 4.1 11/25/2022 02:57 AM    Chloride 102 11/25/2022 02:57 AM    Glucose 96 11/25/2022 02:57 AM    BUN 13 11/25/2022 02:57 AM    Creatinine 0.74 11/25/2022 02:57 AM    Calcium 9.1 11/25/2022 02:57 AM    WBC 7.0 11/25/2022 02:57 AM    HCT 35.1 11/25/2022 02:57 AM    HGB 12.5 11/25/2022 02:57 AM    PLATELET 790 19/65/7906 02:57 AM       Imaging:    Results from Hospital Encounter encounter on 11/24/22    MRI BRAIN WO CONT    Narrative  EXAM: MRI BRAIN WO CONT    INDICATION: s/p TNK    COMPARISON: None. CONTRAST: None. TECHNIQUE:  Multiplanar multisequence acquisition without contrast of the brain. FINDINGS:  Fusion imaging does not show acute ischemic changes. There is no extra-axial fluid collection, hemorrhage or shift. Ventricular size is normal for age. Minimal nonspecific white matter disease. There is a 6 mm left side falx lesion likely meningioma without significant  surrounding edema or mass effect. There is a calcified extra-axial lesion, 15  mm, left frontal without significant surrounding edema likely also a calcified  meningioma. Impression  1. No acute findings. 2. Minimal atrophy and nonspecific white matter changes. 3. Incidental left frontal meningiomas.       Results from [place] encounter on 11/24/22    CT HEAD WO CONT    Narrative  EXAM:  CT HEAD WO CONT    INDICATION: Code stroke status post TNK    COMPARISON: CT 11/24/2022    TECHNIQUE: Axial noncontrast head CT from foramen magnum to vertex. Coronal and  sagittal reformatted images were obtained. CT dose reduction was achieved  through use of a standardized protocol tailored for this examination and  automatic exposure control for dose modulation. FINDINGS:  There is diffuse age-related parenchymal volume loss. The ventricles  and sulci are age-appropriate without hydrocephalus. There is no mass effect or  midline shift. There is no acute parenchymal hemorrhage. Intravascular contrast  is present from prior CTA. Stable scattered foci of low attenuation in the  periventricular white matter most likely represent age-indeterminate  microvascular ischemic changes. The basal cisterns are patent. The osseous structures are intact. A left frontal calvarial inner table  calcification is again noted. The visualized paranasal sinuses and mastoid air  cells are clear. Impression  No acute intracranial hemorrhage. IMPRESSION/RECOMMENDATIONS:  Delmar Mariano is a 80 y.o. right-handed female who presents with right hand numbness, clumsiness, weakness concerning for acute stroke, received TNK at 1620 on 11/24, head CT, CTA head and neck unremarkable for acute infarct or LVO, MRI brain without any evidence of an acute stroke. CTA head and neck does show moderate approximately 50% stenosis of the bilateral ICA origins.     Acute stroke aborted by TNK: Etiology secondary to cardioembolism vs small vessel disease vs large artery atherosclerosis    Post thrombolytic watch:  - Keep blood pressure less than 180/105 mm Hg for 24 hours after infusion of TNKase  -Stop thrombolytic infusion for blood pressure out of range, severe headache, severe bleeding or neurological deterioration  -Avoid antiplatelet or anticoagulant agents for the next 24 hours after administration of TNK and until follow-up imaging at 24 hours with head CT NC, if head CT Noncon is negative for bleed  --can start aspirin 81 mg daily after 24 hours post-TNK; would also recommend starting Plavix 75 mg daily for 21 days total, then aspirin monotherapy for secondary stroke prevention  - Given CTA findings showing approximately 50% stenosis of the bilateral ICA origins, would recommend vascular consultation to evaluate for any intervention other than medication  -Normoglycemia, normothermia  - Risk factor modification: , HgbA1c 5.1    - if LDL > 70, would start atorvastatin 40mg daily    - please check hepatic panel prior to initiation of statin  - please obtain TTE to rule out intracardiac thrombus; no need for bubble    - would monitor on telemetry to rule out arrhythmias    - please obtain PT/OT and speech consultations     We discussed extensively the importance of lifestyle modification. Thank you very much for this consultation. Neurology will follow. I spent 40 minutes providing critical care to this acutely ill inpatient with > 50% of the time counseling and assisting in the coordination of care of the patient on the patient's hospital floor/unit.      Natividad Patton, DO

## 2022-11-25 NOTE — PROGRESS NOTES
Problem: Dysphagia (Adult)  Goal: *Acute Goals and Plan of Care (Insert Text)  Description: Speech pathology goals  Initiated 11/25/2022  1. Patient will tolerate regular/thin liquid diet with no overt s/s aspiration within 7 days  Outcome: Progressing Towards Goal     SPEECH LANGUAGE PATHOLOGY BEDSIDE SWALLOW EVALUATION  Patient: Nacho Hummel [de-identified]80 y.o. female)  Date: 11/25/2022  Primary Diagnosis: Stroke [facility]) [I63.9]       Precautions:        ASSESSMENT :  Based on the objective data described below, the [facility]. Patient reported coughing with ice water and reported \"enlarged esophagus. \" Patient with cough x1 after successive sips of thin liquid via large bore straw while she was talking, however no additional s/s aspiration observed with smaller bore white straw. No difficulty with purees or solids. Suspect esophageal dysphagia as she reported cold liquids are worse than room temperature and frequently talked about her \"enlarged esophagus,\" however patient a poor historian, tangential and verbose. Patient will benefit from skilled intervention to address the above impairments. Patients rehabilitation potential is considered to be Good     PLAN :  Recommendations and Planned Interventions:  -Regular/thin liquid diet, straws ok but use small bore white straw  -SLP to follow for diet tolerance  Frequency/Duration: Patient will be followed by speech-language pathology 2 times a week to address goals. Discharge Recommendations: None     SUBJECTIVE:   Patient stated Look in my throat and you'll see an enlarged esophagus.     OBJECTIVE:   No past medical history on file. No past surgical history on file.   Prior Level of Function/Home Situation:      Diet prior to admission: regular/thin  Current Diet:  NPO   Cognitive and Communication Status:  Neurologic State: Alert  Orientation Level: Oriented X4  Cognition: Decreased attention/concentration, Impulsive           Oral Assessment:  Oral Assessment  Labial: Right droop  Dentition: Upper & lower dentures  Oral Hygiene: moist  Lingual: No impairment  Velum: No impairment  Mandible: No impairment  P.O. Trials:  Patient Position: up in bed  Vocal quality prior to P.O.: No impairment  Consistency Presented: Thin liquid;Puree; Solid  How Presented: Self-fed/presented;Spoon;Straw;Successive swallows     Bolus Acceptance: No impairment  Bolus Formation/Control: No impairment     Propulsion: No impairment  Oral Residue: None        Aspiration Signs/Symptoms: Strong cough (x1 while talking after drinking 2 sips of thn)  Pharyngeal Phase Characteristics: No impairment, issues, or problems              Oral Phase Severity: No impairment  Pharyngeal Phase Severity : No impairment    NOMS:   The NOMS functional outcome measure was used to quantify this patient's level of swallowing impairment. Based on the NOMS, the patient was determined to be at level 7 for swallow function       NOMS Swallowing Levels:  Level 1 (CN): NPO  Level 2 (CM): NPO but takes consistency in therapy  Level 3 (CL): Takes less than 50% of nutrition p.o. and continues with nonoral feedings; and/or safe with mod cues; and/or max diet restriction  Level 4 (CK): Safe swallow but needs mod cues; and/or mod diet restriction; and/or still requires some nonoral feeding/supplements  Level 5 (CJ): Safe swallow with min diet restriction; and/or needs min cues  Level 6 (CI): Independent with p.o.; rare cues; usually self cues; may need to avoid some foods or needs extra time  Level 7 ([address] [place] [address]): Independent for all p.o.  LIAM. (2003). National Outcomes Measurement System (NOMS): Adult Speech-Language Pathology User's Guide. Pain:             After treatment:   Patient left in no apparent distress in bed, Call bell within reach, and Nursing notified    COMMUNICATION/EDUCATION:   Patient was educated regarding her deficit(s) of WFL swallow as this relates to her diagnosis.   She demonstrated guarded understanding as evidenced by tangential speech. The patient's plan of care including recommendations, planned interventions, and recommended diet changes were discussed with: Registered nurse and Physician. Patient/family have participated as able in goal setting and plan of care. Patient/family agree to work toward stated goals and plan of care.     Thank you for this referral.  EYAD Cotton  Time Calculation: 15 mins

## 2022-11-25 NOTE — H&P
[facility] INITIAL ASSESSMENT. Name: Jemma Lino   : 1940   MRN: [id number]   Date: 2022        Chief Complaint   Patient presents with    Hand Pain     Patient presents to triage ambulatory complaining of having a stroke. Patient reports she began to experience right hand pain while eating dinner. Patient is speaking in complete sentences, tongue is midline and speech is clear. Patient is using her right hand however reports it's numb and she is unable to feel her fingers. Patient used her right hand to remove her earrings from her ears and necklaces around her neck. Patient denies any headache, fever, chills or nausea/vomiting. HPI:     81 y/o with pmh of hypothyroidism and HTN presented for right upper extremity weakness started at 1615. Upon arrival to the ED, weakness had resolved. Pt a little confused but admitted to having some wine. Stroke code activated,  CTH negative for bleed. CTA did not show a LVO. Tele neuro recommended TNK. However, /93. Given 10mg labetalol with improvement in BP and TNK given at 1820. ICU consulted for admission. Upon my arrival pt alert and oriented x3, no focal deficits appreciated. Will be admitted to ICU for frequent neuro checks s/p TNK.     Active Problems Being Managed:     Stroke symptoms s/p TNK in ED  HTN  Hypothyroidism    Assessment/Plan:     Stroke symptoms s/p TNK in ED  -s/p TNK at 200  -NIH stroke assessment per post TNK policy  -head ct 24 hours post TNK or sooner if s/o neurological deterioration  -no antiplatelets or anticoagulant medications for 24 hours post TNK and after follow up head CT negative for bleed, after that time start daily aspirin and sub Q heparin   -neurology consult in am  -MRI tomorrow  -send lipid panel and HgA1c    HTN  -s/p labetalol 10mg with improvement in BP  -can start nicardipine gtt if SBP>180 or DBP>110    Hypothyroidism  -cont home synthroid  -send TSH    DVT prophylaxis: SCDs  SUP: n/a  Code status: Full    Next of kin: Jaspreet Hanna (spouse)    I personally spent 60 minutes of critical care time. This is time spent at this critically ill patient's bedside actively involved in patient care as well as the coordination of care and discussions with the patient's family. This does not include any procedural time which has been billed separately. Review of systems:     Review of Systems   Constitutional:  Negative for chills and fever. Eyes:  Negative for blurred vision and double vision. Respiratory:  Negative for cough. Cardiovascular:  Negative for chest pain and palpitations. Gastrointestinal:  Negative for abdominal pain, heartburn, nausea and vomiting. Genitourinary:  Negative for dysuria and urgency. Musculoskeletal:  Negative for myalgias. Neurological:  Negative for dizziness and headaches. Objective:     Vital Signs:  Visit Vitals  BP (!) 149/68   Pulse 62   Temp 98.9 °F (37.2 °C)   Resp 20   Ht 5' (1.524 m)   Wt 51.7 kg (113 lb 15.7 oz)   SpO2 97%   BMI 22.26 kg/m²      O2 Device: None (Room air) Temp (24hrs), Av.9 °F (37.2 °C), Min:98.9 °F (37.2 °C), Max:98.9 °F (37.2 °C)           Intake/Output:   No intake or output data in the 24 hours ending 22    Physical Exam:  Physical Exam  Constitutional:       General: She is not in acute distress. HENT:      Head: Normocephalic. Nose: Nose normal.      Mouth/Throat:      Mouth: Mucous membranes are moist.   Eyes:      Extraocular Movements: Extraocular movements intact. Conjunctiva/sclera: Conjunctivae normal.      Pupils: Pupils are equal, round, and reactive to light. Cardiovascular:      Rate and Rhythm: Normal rate and regular rhythm. Pulses: Normal pulses. Heart sounds: Normal heart sounds. Pulmonary:      Effort: Pulmonary effort is normal.      Breath sounds: Normal breath sounds. Abdominal:      General: Abdomen is flat. There is no distension. Palpations: Abdomen is soft. Tenderness: There is no abdominal tenderness. Musculoskeletal:         General: Normal range of motion. Cervical back: Normal range of motion. Skin:     General: Skin is warm. Capillary Refill: Capillary refill takes less than 2 seconds. Neurological:      General: No focal deficit present. Mental Status: She is alert and oriented to person, place, and time. Cranial Nerves: No cranial nerve deficit. Motor: No weakness. Past Medical History:        has no past medical history on file. Past Surgical History:      has no past surgical history on file. Home Medications:     Prior to Admission medications    Not on File         Allergies/Social/Family History:     No Known Allergies   Social History     Tobacco Use    Smoking status: Not on file    Smokeless tobacco: Not on file   Substance Use Topics    Alcohol use: Not on file      No family history on file. LABS AND  DATA:   Reviewed      Peak airway pressure:      Minute ventilation:        CRITICAL CARE CONSULTANT NOTE  I had a face to face encounter with the patient, reviewed and interpreted patient data including clinical events, labs, images, vital signs, I/O's, and examined patient. I have discussed the case and the plan and management of the patient's care with the consulting services, the bedside nurses and the respiratory therapist.      NOTE OF PERSONAL INVOLVEMENT IN CARE   This patient has a high probability of imminent, clinically significant deterioration, which requires the highest level of preparedness to intervene urgently. I participated in the decision-making and personally managed or directed the management of the following life and organ supporting interventions that required my frequent assessment to treat or prevent imminent deterioration.         Avel Nick NP   Pulmonary/CCM  [place] [facility]  [phone]  11/24/2022

## 2022-11-25 NOTE — PROGRESS NOTES
Received OTorder. Pt adm with RUE weakness/numbness; high BP but was controlled and TNK given at 1620 yesterday; Aware of guidelines for OT intervention possibly with clearance at 12 hrs but pt is still in the ED awaiting CCU bed. Will defer at this time until pt in CCU with adequate support to be safely monitored during evaluation. Will follow.

## 2022-11-25 NOTE — ED NOTES
Contacted Dr. Edgar Ocampo concerning pt's elevated BP. Provider stated she will order IV hydralazine.

## 2022-11-25 NOTE — ED NOTES
Pt requested coffee. Spoke verbally with provider. Provider stated that pt could have coffee at this time.

## 2022-11-25 NOTE — ED NOTES
Pt called this nurse into the room and was distressed over not having anything to eat as well as not receiving the medicine that she needs. Pt is tearful and stating that she needs help. This nurse contacted provider. Provider at bedside evaluating pt.

## 2022-11-26 ENCOUNTER — APPOINTMENT (OUTPATIENT)
Dept: NON INVASIVE DIAGNOSTICS | Age: 82
DRG: 063 | End: 2022-11-26
Attending: INTERNAL MEDICINE
Payer: MEDICARE

## 2022-11-26 PROCEDURE — 97116 GAIT TRAINING THERAPY: CPT

## 2022-11-26 PROCEDURE — 65270000046 HC RM TELEMETRY

## 2022-11-26 PROCEDURE — 97161 PT EVAL LOW COMPLEX 20 MIN: CPT

## 2022-11-26 PROCEDURE — 74011250636 HC RX REV CODE- 250/636: Performed by: STUDENT IN AN ORGANIZED HEALTH CARE EDUCATION/TRAINING PROGRAM

## 2022-11-26 PROCEDURE — 93306 TTE W/DOPPLER COMPLETE: CPT

## 2022-11-26 PROCEDURE — 99233 SBSQ HOSP IP/OBS HIGH 50: CPT | Performed by: PSYCHIATRY & NEUROLOGY

## 2022-11-26 PROCEDURE — 74011250637 HC RX REV CODE- 250/637: Performed by: STUDENT IN AN ORGANIZED HEALTH CARE EDUCATION/TRAINING PROGRAM

## 2022-11-26 PROCEDURE — 74011250637 HC RX REV CODE- 250/637: Performed by: INTERNAL MEDICINE

## 2022-11-26 PROCEDURE — 74011250637 HC RX REV CODE- 250/637: Performed by: NURSE PRACTITIONER

## 2022-11-26 RX ORDER — LORAZEPAM 0.5 MG/1
0.5 TABLET ORAL ONCE
Status: COMPLETED | OUTPATIENT
Start: 2022-11-26 | End: 2022-11-26

## 2022-11-26 RX ORDER — LIOTHYRONINE SODIUM 5 UG/1
5 TABLET ORAL DAILY
Status: DISCONTINUED | OUTPATIENT
Start: 2022-11-27 | End: 2022-11-27 | Stop reason: HOSPADM

## 2022-11-26 RX ORDER — LISINOPRIL 20 MG/1
20 TABLET ORAL DAILY
Status: DISCONTINUED | OUTPATIENT
Start: 2022-11-27 | End: 2022-11-27 | Stop reason: HOSPADM

## 2022-11-26 RX ORDER — LEVOTHYROXINE SODIUM 75 UG/1
37.5 TABLET ORAL
Status: DISCONTINUED | OUTPATIENT
Start: 2022-11-27 | End: 2022-11-27 | Stop reason: HOSPADM

## 2022-11-26 RX ORDER — HYDROCHLOROTHIAZIDE 25 MG/1
12.5 TABLET ORAL DAILY
Status: DISCONTINUED | OUTPATIENT
Start: 2022-11-27 | End: 2022-11-27 | Stop reason: HOSPADM

## 2022-11-26 RX ADMIN — METOPROLOL TARTRATE 25 MG: 25 TABLET, FILM COATED ORAL at 12:20

## 2022-11-26 RX ADMIN — LEVOTHYROXINE SODIUM 50 MCG: 0.05 TABLET ORAL at 05:45

## 2022-11-26 RX ADMIN — LORAZEPAM 0.5 MG: 0.5 TABLET ORAL at 17:40

## 2022-11-26 RX ADMIN — ENOXAPARIN SODIUM 30 MG: 100 INJECTION SUBCUTANEOUS at 09:00

## 2022-11-26 RX ADMIN — LABETALOL HYDROCHLORIDE 10 MG: 5 INJECTION INTRAVENOUS at 18:54

## 2022-11-26 RX ADMIN — ATORVASTATIN CALCIUM 40 MG: 40 TABLET, FILM COATED ORAL at 22:05

## 2022-11-26 RX ADMIN — ASPIRIN 81 MG: 81 TABLET, CHEWABLE ORAL at 09:00

## 2022-11-26 RX ADMIN — ACETAMINOPHEN 650 MG: 325 TABLET ORAL at 17:40

## 2022-11-26 RX ADMIN — METOPROLOL TARTRATE 25 MG: 25 TABLET, FILM COATED ORAL at 22:05

## 2022-11-26 NOTE — PROGRESS NOTES
Hospitalist Progress Note    NAME: Delmar Mariano   :  1940   MRN:  [id number]       Assessment / Plan:  CVA/TIA status post TNKase  Essential hypertension  Hypothyroidism  Anxiety disorder    Neurology consulted, greatly appreciate recommendations  -s/p TNKase  --Start aspirin 81 mg daily  --Recommended Plavix for 21-day total -MRI brain negative for CVA we will need to clarify with neurology if this is still indicated, will not order at this time  -A1c 5.1, , Start atorvastatin 40 mg daily-LFTs WNL   -On review of PCP office visit does not appear she carries diagnosis of hypothyroidism and patient is unsure of her medications  --TSH elevated at 21.7 agree with initiation of levothyroxine at 50 mcg daily  --Continue Lopressor 25 mg twice daily, PTA lisinopril-hydrochlorothiazide  -Noted 50% stenosis of bilateral ICA, will need outpatient vascular surgery referral at time of discharge  -TTE pending  -Recommend follow-up with PCP, consider carpal tunnel testing    18.5 - 24.9 Normal weight / Body mass index is 22.26 kg/m². Estimated discharge date:   Barriers: ECHO, bp control    Code status: Full  Prophylaxis: Lovenox  Recommended Disposition: Home w/Family     Subjective:     Chief Complaint / Reason for Physician Visit  \"RUE weakness\". Discussed with RN events overnight. Patient concerned about blood pressure medications, and thyroid meds. Endorses generalized weakness, no focal signs  Answered all questions to satisfaction    Review of Systems:  Symptom Y/N Comments  Symptom Y/N Comments   Fever/Chills n   Chest Pain n    Poor Appetite    Edema     Cough n   Abdominal Pain n    Sputum    Joint Pain     SOB/SOOD n   Pruritis/Rash     Nausea/vomit n   Tolerating PT/OT     Diarrhea    Tolerating Diet     Constipation    Other       Could NOT obtain due to:      Objective:     VITALS:   Last 24hrs VS reviewed since prior progress note.  Most recent are:  Patient Vitals for the past 24 hrs:   Temp Pulse Resp BP SpO2   11/26/22 1200 -- 60 -- -- --   11/26/22 0800 -- (!) 52 -- -- --   11/26/22 0733 97.9 °F (36.6 °C) (!) 54 14 (!) 194/73 98 %   11/26/22 0400 -- (!) 57 -- -- --   11/26/22 0321 98.2 °F (36.8 °C) (!) 57 16 (!) 146/67 99 %   11/26/22 0000 -- (!) 55 -- -- --   11/25/22 2301 98.6 °F (37 °C) (!) 55 18 (!) 209/88 99 %   11/25/22 2154 97.9 °F (36.6 °C) 60 18 (!) 196/85 --   11/25/22 1957 -- 60 18 (!) 149/70 96 %   11/25/22 1927 -- 60 16 (!) 147/71 95 %   11/25/22 1730 -- 65 16 (!) 151/83 97 %   11/25/22 1628 -- 73 25 (!) 140/72 96 %   11/25/22 1528 -- 64 18 128/60 95 %   11/25/22 1428 -- 97 18 (!) 150/65 96 %   11/25/22 1358 -- 88 15 (!) 165/58 95 %   11/25/22 1327 -- 85 -- (!) 152/66 95 %     No intake or output data in the 24 hours ending 11/26/22 1316     I had a face to face encounter and independently examined this patient on 11/26/2022, as outlined below:  PHYSICAL EXAM:  General:  Alert, cooperative, no acute distress    EENT:  EOMI. Anicteric sclerae. MMM  Resp:  CTA bilaterally, no wheezing or rales. No accessory muscle use  CV:  Regular  rhythm,  No edema  GI:  Soft, Non distended, Non tender. +Bowel sounds  Neurologic:  Alert and oriented X 3, normal speech,   Psych:   Not anxious nor agitated  Skin:  No rashes. No jaundice    Reviewed most current lab test results and cultures  YES  Reviewed most current radiology test results   YES  Review and summation of old records today    NO  Reviewed patient's current orders and MAR    YES  PMH/SH reviewed - no change compared to H&P  ________________________________________________________________________  Care Plan discussed with:    Comments   Patient x    Family      RN x    Care Manager     Consultant                        Multidiciplinary team rounds were held today with , nursing, pharmacist and clinical coordinator.   Patient's plan of care was discussed; medications were reviewed and discharge planning was addressed. ________________________________________________________________________  Total NON critical care TIME:  35   Minutes    Total CRITICAL CARE TIME Spent:   Minutes non procedure based      Comments   >50% of visit spent in counseling and coordination of care x    ________________________________________________________________________  Andrea Smith MD     Procedures: see electronic medical records for all procedures/Xrays and details which were not copied into this note but were reviewed prior to creation of Plan. LABS:  I reviewed today's most current labs and imaging studies.   Pertinent labs include:  Recent Labs     11/25/22 0257 11/24/22  1825   WBC 7.0 7.5   HGB 12.5 13.3   HCT 35.1 38.7    304     Recent Labs     11/25/22 0257 11/24/22  1825   * 135*   K 4.1 3.4*    100   CO2 26 28   GLU 96 98   BUN 13 16   CREA 0.74 0.88   CA 9.1 9.9   MG 2.0  --    PHOS 3.7  --    ALB  --  3.9   TBILI  --  0.3   ALT  --  25   INR  --  0.9       Signed: Andrea Smith MD

## 2022-11-26 NOTE — PROGRESS NOTES
Occupational Therapy    Attempted to see multiple attempts, patient in shower/bathroom requesting therapist to wait, discussed with nursing who stated patient requested privacy, upon re-attempt discussed with PT who reports independent. Patient up ad/luis miguel in room doing all toileting on own. May benefit from cognition OP OT evaluation to improve independence with IADLs/safety due to impaired cognition. No acute OT needs. Mayra Robins, MS OTR/L

## 2022-11-26 NOTE — PROGRESS NOTES
Neurology Note    Patient ID:  Cortez Giles  [id number]  14 y.o.  1940      Date of Consultation:  November 26, 2022      Assessment and Plan:    The patient is a pleasant 20-year-old female admitted to the hospital with acute onset of right-sided weakness and sensory disturbance. She was given TNKase in the emergency department with complete resolution of symptoms. Aborted stroke:  Status post TNKase  Continue aspirin 81 mg. She was not taking antiplatelets prior to hospital stay  Brain MRI with no evidence of an acute stroke. There was mild atrophy and nonspecific chronic microvascular ischemic changes. CTA with no evidence of large vessel flow-limiting stenosis. There was 50% stenosis of the bilateral carotid arteries. Will need to follow with vascular surgery as an outpatient  Dyslipidemia: . Patient needs aggressive statin therapy with goal LDL less than 70  Hemoglobin A1c 5.1. The patient had not received an echocardiogram.  This will need to be obtained. If unable to be obtained today, will need to be obtained as an outpatient. Physical therapy and Occupational Therapy have been following along    I provided stroke education today in regards to risk factors for stroke and lifestyle modifications to help minimize the risk of future stroke. This included medication compliance, regular follow up with primary care physician,  and healthy lifestyle habits (nutrition/exercise)      Left frontal meningioma: The patient reports that she is aware of these and has known about it for many years. She will need follow-up imaging as an outpatient with her neurologist    Hypothyroidism: Continue supplementation    There is no other additional neurology recommendations at this time. If questions arise, please not hesitate to contact me and I will return to see the patient. The patient should follow-up in clinic in 3 to 4 weeks time after discharge.            Subjective: I feel stronger History of Present Illness:   Josefa Patton is a 80 y.o. female who was admitted to the hospital on November 24, 2022 due to right hand pain and sensory disturbance. There was some clumsiness noted in the emergency department. She did have an initial head CT and CTA which was unremarkable. She was given [facility] on November 24. She has been followed by one of my colleagues, Dr. Russell Palomares, during the hospital stay. Follow-up brain MRI revealed no evidence of a stroke. There has been no new numbness, tingling, or weakness since admission. The patient does feel that her right arm is functioning normally. Past medical history:  Hypertension  Hypothyroidism  Known meningioma    Past surgical history: There is no pertinent surgical history    Family history:   No family history of neuromuscular disease.     Social History     Tobacco Use    Smoking status: denies    Smokeless tobacco: Not on file   Substance Use Topics    Alcohol use: denies        No Known Allergies     Prior to Admission medications    Not on File     Current Facility-Administered Medications   Medication Dose Route Frequency    metoprolol tartrate (LOPRESSOR) tablet 25 mg  25 mg Oral Q12H    enoxaparin (LOVENOX) injection 30 mg  30 mg SubCUTAneous Q24H    atorvastatin (LIPITOR) tablet 40 mg  40 mg Oral QHS    labetaloL (NORMODYNE;TRANDATE) injection 10 mg  10 mg IntraVENous Q5MIN PRN    acetaminophen (TYLENOL) tablet 650 mg  650 mg Oral Q4H PRN    Or    acetaminophen (TYLENOL) solution 650 mg  650 mg Per NG tube Q4H PRN    Or    acetaminophen (TYLENOL) suppository 650 mg  650 mg Rectal Q4H PRN    aspirin chewable tablet 81 mg  81 mg Oral DAILY    levothyroxine (SYNTHROID) tablet 50 mcg  50 mcg Oral 6am       Review of Systems:    General, constitutional: negative  Eyes, vision: negative  Ears, nose, throat: negative  Cardiovascular, heart: negative  Respiratory: negative  Gastrointestinal: negative  Genitourinary: negative  Musculoskeletal: negative  Skin and integumentary: negative  Psychiatric: negative  Endocrine: negative  Neurological: negative, except for HPI  Hematologic/lymphatic: negative  Allergy/immunology: negative    Objective:     Visit Vitals  BP (!) 194/73   Pulse 60   Temp 97.9 °F (36.6 °C)   Resp 14   Ht 5' (1.524 m)   Wt 113 lb 15.7 oz (51.7 kg)   SpO2 98%   BMI 22.26 kg/m²       Physical Exam:      General:  appears well nourished in no acute distress. Slightly anxious today  Neck: no carotid bruits  Lungs: clear to auscultation  Heart:  no murmurs, regular rate  Lower extremity: peripheral pulses palpable and no edema  Skin: intact    Neurological exam:    Awake, alert, oriented to person, place and time  Recent and remote memory were normal  Attention and concentration were intact  Language was intact. There was no aphasia  Speech: no dysarthria  Fund of knowledge was preserved    Cranial nerves:   II-XII were tested    Perrrla  Visual fields were full  Eomi, no evidence of nystagmus  Facial sensation:  normal and symmetric  Facial motor: normal and symmetric  Hearing intact  SCM strength intact  Tongue: midline without fasciculations    Motor: Tone normal  Pronator drift is absent  No evidence of fasciculations    Strength testing:   deltoid triceps biceps Wrist ext. Wrist flex. intrinsics Hip flex. Hip ext. Knee ext. Knee flex Dorsi flex Plantar flex   Right 5 5 5 5 5 5 5 5 5 5 5 5   Left 5 5 5 5 5 5 5 5 5 5 5 5         Sensory:  Upper extremity: intact to pp, light touch, and vibration > 10 seconds  Lower extremity: intact to pp, light touch, and vibration 8 seconds at her ankles    Reflexes:    Right Left  Biceps  2 2  Triceps 2 2  Brachiorad.  2 2  Patella  2 2  Achilles 2 2    Plantar response:  flexor bilaterally      Cerebellar testing:  no tremor apparent, finger/nose and denzel were intact    Gait: This was not assessed this morning however reportedly she did walk to the bathroom independently    Labs:     Lab Results Component Value Date/Time    Hemoglobin A1c 5.1 11/25/2022 02:57 AM    Sodium 134 (L) 11/25/2022 02:57 AM    Potassium 4.1 11/25/2022 02:57 AM    Chloride 102 11/25/2022 02:57 AM    Glucose 96 11/25/2022 02:57 AM    BUN 13 11/25/2022 02:57 AM    Creatinine 0.74 11/25/2022 02:57 AM    Calcium 9.1 11/25/2022 02:57 AM    WBC 7.0 11/25/2022 02:57 AM    HCT 35.1 11/25/2022 02:57 AM    HGB 12.5 11/25/2022 02:57 AM    PLATELET 816 06/03/3089 02:57 AM       Imaging:    Results from Hospital Encounter encounter on 11/24/22    MRI BRAIN WO CONT    Narrative  EXAM: MRI BRAIN WO CONT    INDICATION: s/p TNK    COMPARISON: None. CONTRAST: None. TECHNIQUE:  Multiplanar multisequence acquisition without contrast of the brain. FINDINGS:  Fusion imaging does not show acute ischemic changes. There is no extra-axial fluid collection, hemorrhage or shift. Ventricular size is normal for age. Minimal nonspecific white matter disease. There is a 6 mm left side falx lesion likely meningioma without significant  surrounding edema or mass effect. There is a calcified extra-axial lesion, 15  mm, left frontal without significant surrounding edema likely also a calcified  meningioma. Impression  1. No acute findings. 2. Minimal atrophy and nonspecific white matter changes. 3. Incidental left frontal meningiomas. Results from [place] encounter on 11/24/22    CT HEAD WO CONT    Narrative  EXAM: CT HEAD WO CONT    INDICATION: s/p tnk    COMPARISON: November 25 earlier examination. .    CONTRAST: None. TECHNIQUE: Unenhanced CT of the head was performed using 5 mm images. Brain and  bone windows were generated. Coronal and sagittal reformats. CT dose reduction  was achieved through use of a standardized protocol tailored for this  examination and automatic exposure control for dose modulation. FINDINGS:  No extra-axial fluid collection acute hemorrhage or shift.     Impression  No change no acute findings. I spent   40  minutes providing care to this  acutely ill inpatient with > 50% of the time counseling and assisting in the coordination of care of the patient on the patient's hospital floor/unit.                  Active Problems:    Stroke [facility]) (11/24/2022)                 Signed By:  Skylar Carrero DO FAAN    November 26, 2022

## 2022-11-26 NOTE — PROGRESS NOTES
This note is compiled to communicate with the medical care team. It may contain sensitive and protected information. It is not intended to serve as a source of communication with patients/families/friends; that communication occurs at the bedside or via alternative direct communications means (secure messaging, letters, video/telephone, etc.). Hospitalist acceptance note    NAME: Angelica Dawson   :  1940   MRN:  [id number]     Date/Time:  2022 8:22 PM    Patient PCP: None  ______________________________________________________________________  Given the patient's current clinical presentation, I have a high level of concern for decompensation if discharged from the emergency department. Complex decision making was performed, which includes reviewing the patient's available past medical records, laboratory results, and x-ray films. My assessment of this patient's clinical condition and my plan of care is as follows. Subjective:   CHIEF COMPLAINT: CVA    HISTORY OF PRESENT ILLNESS:     Angelica Dawson is a 80 y.o.  female with pertinent past medical history of essential hypertension and anxiety who presented with complaints of right hand clumsiness with associated paresthesias and weakness. Patient stroke alerted and CT neuro protocol demonstrated no acute process. Telemetry neuro consulted and after discussion with patient and family TKAase given at 46 on . Patient initially admitted to ICU team, but roomed in ED. She has been observed for 24 hours without change in status. Currently reports all symptoms are resolved. MRI brain performed this morning demonstrates no acute findings with incidental left frontal meningiomas noted. ICU team requesting transfer to medicine service as she no longer has ICU indication. Past medical history-hypertension, anxiety    No past surgical history on file.     Social History     Tobacco Use    Smoking status: Not on file    Smokeless tobacco: Not on file   Substance Use Topics    Alcohol use: Not on file        No family history on file. Patient denies pertinent family history    No Known Allergies     Prior to Admission medications    Not on File       REVIEW OF SYSTEMS:       Total of 12 systems reviewed as follows:       POSITIVE= Red text   General: Fever, chills, sweats, weakness/malaise, weight loss/gain, loss of appetite    Eyes:   Vision change, eye pain   ENT:    Rhinorrhea, pharyngitis, Hearing loss    Respiratory:   cough, sputum production, SOB, SOOD, wheezing, pleuritic pain    Cardiology:   chest pain, palpitations, orthopnea, edema, syncope    Gastrointestinal:  abdominal pain , N/V, diarrhea, dysphagia, constipation, bleeding    Genitourinary:  frequency, urgency, dysuria, hematuria, incontinence    Muskuloskeletal:  arthralgia, myalgia, back pain   Hematology:  easy bruising, nose or gum bleeding, lymphadenopathy    Dermatological: rash, ulceration, pruritis, color change / jaundice   Endocrine:  hot flashes or polydipsia    Neurological:  headache, dizziness, confusion, focal weakness, paresthesia, Speech difficulties, memory loss, gait difficulty   Psychological: Feelings of anxiety, depression, agitation       Objective:   VITALS:    Visit Vitals  BP (!) 151/83   Pulse 65   Temp 98.2 °F (36.8 °C)   Resp 16   Ht 5' (1.524 m)   Wt 51.7 kg (113 lb 15.7 oz)   SpO2 97%   BMI 22.26 kg/m²       PHYSICAL EXAM:    General:   Alert, cooperative, no distress, elderly  female        HEENT:  Atraumatic, anicteric sclerae, pink conjunctivae, mucosa moist, dentition fair     Neck:  Supple, symmetrical, trachea midline, no abnormalities on palpation     Lungs:   CTAB. Symmetric expansion. Good aeration. Normal respiratory effort. Chest wall:  No tenderness. No Accessory muscle use. Cardiovascular:   RRR, No murmur/rub/gallop. No JVD. Radial/AT/DP pulse 2+     GI/:   Soft, non-tender. Not distended.   Bowel sounds normal. No HSM     Extremities No edema. No cyanosis. Capillary refill normal     Skin: No significant lesions noted. Not Jaundiced. No rashes      Neurologic: PERRL. EOMI. No facial asymmetry. No aphasia or slurred speech. Moves all extremities. Sensation to light touch grossly intact BUE/BLE.  No overt focal deficits appreciated    NIHSS-0     Psych:  Alert and oriented x4, intermittent confusion, normal affect     Other:   N/A         LAB DATA REVIEWED:    Recent Results (from the past 24 hour(s))   EKG, 12 LEAD, INITIAL    Collection Time: 11/24/22  9:48 PM   Result Value Ref Range    Ventricular Rate 58 BPM    Atrial Rate 58 BPM    P-R Interval 122 ms    QRS Duration 100 ms    Q-T Interval 462 ms    QTC Calculation (Bezet) 453 ms    Calculated P Axis 78 degrees    Calculated R Axis 49 degrees    Calculated T Axis 77 degrees    Diagnosis       Sinus bradycardia  Incomplete right bundle branch block  Minimal voltage criteria for LVH, may be normal variant  Possible Lateral infarct , age undetermined  No previous ECGs available  Confirmed by Ama Catherine ([id number]) on 11/25/2022 1:12:03 PM     ETHYL ALCOHOL    Collection Time: 11/24/22  9:59 PM   Result Value Ref Range    ALCOHOL(ETHYL),SERUM <10 <10 MG/DL   DRUG SCREEN, URINE    Collection Time: 11/24/22  9:59 PM   Result Value Ref Range    AMPHETAMINES Negative NEG      BARBITURATES Negative NEG      BENZODIAZEPINES Negative NEG      COCAINE Negative NEG      METHADONE Negative NEG      OPIATES Negative NEG      PCP(PHENCYCLIDINE) Negative NEG      THC (TH-CANNABINOL) Negative NEG      Drug screen comment (NOTE)    URINALYSIS W/ REFLEX CULTURE    Collection Time: 11/24/22  9:59 PM    Specimen: Urine   Result Value Ref Range    Color YELLOW/STRAW      Appearance CLEAR CLEAR      Specific gravity 1.024      pH (UA) 6.5 5.0 - 8.0      Protein Negative NEG mg/dL    Glucose Negative NEG mg/dL    Ketone Negative NEG mg/dL    Bilirubin Negative NEG      Blood Negative NEG Urobilinogen 0.2 0.2 - 1.0 EU/dL    Nitrites Negative NEG      Leukocyte Esterase TRACE (A) NEG      UA:UC IF INDICATED CULTURE NOT INDICATED BY UA RESULT      WBC 0-4 0 - 4 /hpf    RBC 0-5 0 - 5 /hpf    Epithelial cells FEW FEW /lpf    Bacteria Negative NEG /hpf    Hyaline cast 0-2 0 - 2 /lpf   LIPID PANEL    Collection Time: 11/25/22  2:57 AM   Result Value Ref Range    Cholesterol, total 186 <200 MG/DL    Triglyceride 62 <150 MG/DL    HDL Cholesterol 61 MG/DL    LDL, calculated 112.6 (H) 0 - 100 MG/DL    VLDL, calculated 12.4 MG/DL    CHOL/HDL Ratio 3.0 0.0 - 5.0     HEMOGLOBIN A1C WITH EAG    Collection Time: 11/25/22  2:57 AM   Result Value Ref Range    Hemoglobin A1c 5.1 4.0 - 5.6 %    Est. average glucose 100 mg/dL   CBC WITH AUTOMATED DIFF    Collection Time: 11/25/22  2:57 AM   Result Value Ref Range    WBC 7.0 3.6 - 11.0 K/uL    RBC 3.88 3.80 - 5.20 M/uL    HGB 12.5 11.5 - 16.0 g/dL    HCT 35.1 35.0 - 47.0 %    MCV 90.5 80.0 - 99.0 FL    MCH 32.2 26.0 - 34.0 PG    MCHC 35.6 30.0 - 36.5 g/dL    RDW 12.1 11.5 - 14.5 %    PLATELET 535 280 - 590 K/uL    MPV 9.6 8.9 - 12.9 FL    NRBC 0.0 0  WBC    ABSOLUTE NRBC 0.00 0.00 - 0.01 K/uL    NEUTROPHILS 59 32 - 75 %    LYMPHOCYTES 29 12 - 49 %    MONOCYTES 11 5 - 13 %    EOSINOPHILS 1 0 - 7 %    BASOPHILS 0 0 - 1 %    IMMATURE GRANULOCYTES 0 0.0 - 0.5 %    ABS. NEUTROPHILS 4.0 1.8 - 8.0 K/UL    ABS. LYMPHOCYTES 2.0 0.8 - 3.5 K/UL    ABS. MONOCYTES 0.8 0.0 - 1.0 K/UL    ABS. EOSINOPHILS 0.1 0.0 - 0.4 K/UL    ABS. BASOPHILS 0.0 0.0 - 0.1 K/UL    ABS. IMM.  GRANS. 0.0 0.00 - 0.04 K/UL    DF AUTOMATED     METABOLIC PANEL, BASIC    Collection Time: 11/25/22  2:57 AM   Result Value Ref Range    Sodium 134 (L) 136 - 145 mmol/L    Potassium 4.1 3.5 - 5.1 mmol/L    Chloride 102 97 - 108 mmol/L    CO2 26 21 - 32 mmol/L    Anion gap 6 5 - 15 mmol/L    Glucose 96 65 - 100 mg/dL    BUN 13 6 - 20 MG/DL    Creatinine 0.74 0.55 - 1.02 MG/DL    BUN/Creatinine ratio 18 12 - 20 eGFR >60 >60 ml/min/1.73m2    Calcium 9.1 8.5 - 10.1 MG/DL   MAGNESIUM    Collection Time: 11/25/22  2:57 AM   Result Value Ref Range    Magnesium 2.0 1.6 - 2.4 mg/dL   PHOSPHORUS    Collection Time: 11/25/22  2:57 AM   Result Value Ref Range    Phosphorus 3.7 2.6 - 4.7 MG/DL   T4, FREE    Collection Time: 11/25/22  1:42 PM   Result Value Ref Range    T4, Free 0.9 0.8 - 1.5 NG/DL   T3, FREE    Collection Time: 11/25/22  1:42 PM   Result Value Ref Range    Free Triiodothyronine (T3) 1.8 (L) 2.2 - 4.0 pg/mL       IMAGING:  CT neuro perfusion and CTA head and neck:  1. No acute large vessel occlusion. Diminutive vertebrobasilar system with  patent posterior communicating arteries as above. 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 2.  Moderate stenoses bilateral internal carotid artery origins. 3. No perfusion abnormality. If high clinical concern for acute infarction  consider MRI. 4. Extradural calcifications as above likely represent meningiomas. CT head-no acute intracranial hemorrhage    MRI brain:  1. No acute findings. 2. Minimal atrophy and nonspecific white matter changes. 3. Incidental left frontal meningiomas.     EKG: Sinus bradycardia with incomplete RBBB (no priors), rate 58, , QTc 453  ______________________________________________________________________    Assessment / Plan:  CVA/TIA status post TN K  Essential hypertension  Hypothyroidism  Anxiety disorder    PLAN:    Accept to medicine service  Neurology consulted, greatly appreciate recommendations  -Now 24 hours out from [facility]  --Start aspirin 81 mg daily  --Recommended Plavix for 21-day total -MRI brain negative for CVA we will need to clarify with neurology if this is still indicated, will not order at this time  -Start atorvastatin 40 mg daily-LFTs WNL 11/24  -On review of PCP office visit does not appear she carries diagnosis of hypothyroidism and patient is unsure of her medications  --TSH elevated at 21.7 agree with initiation of levothyroxine at 50 mcg daily  -Discontinue nicardipine and hydralazine as needed  --Continue Lopressor 25 mg twice daily, if persistently hypertensive would reinitiate PTA lisinopril-hydrochlorothiazide  -Noted 50% stenosis of bilateral ICA, will need outpatient vascular surgery referral at time of discharge  -No need for TTE given negative MRI brain  -Recommend follow-up with PCP, consider carpal tunnel testing    Code Status: Full    DVT Prophylaxis: Lovenox  GI Prophylaxis: Not indicated  Diet: Regular       Care Plan discussed with:    Comments   Patient x    Family      RN     Care Manager                    Consultant:      _______________________________________________________________________  Baseline Level of Function: Independent    Expected  Disposition:   Home with Family x   HH/PT/OT/RN    SNF/LTC    ALANNA    ________________________________________________________________________  TOTAL TIME:  70 Minutes   MEDICAL COMPLEXITY: high  TOBACCO CESSATION COUNSELING: NO        Comments    x Reviewed previous records   >50% of visit spent in counseling and coordination of care x Discussion with patient and/or family and questions answered       ________________________________________________________________________  Signed: Beto Ricks DO  11/25/2022 8:22 PM    Please note that this documentation was completed in part with Dragon dictation software. Occasionally unanticipated grammatical, syntax, homophones, and other interpretive errors are inadvertently transcribed by the computer software. Please excuse and disregard any errors that have escaped final proofreading. If in doubt, please do not hesitate to reach out to myself or the assigned hospitalist via [facility] paging system for clarification.

## 2022-11-26 NOTE — PROGRESS NOTES
End of Shift Note     Bedside shift change report given to MONTY Sorto (oncoming nurse) by Timur Koenig RN (offgoing nurse). Report included the following information SBAR, Kardex, Intake/Output, Recent Results, Cardiac Rhythm NSR, and Dual Neuro Assessment     Shift worked:  days   Shift summary and any significant changes:     Echo completed  One time PO dose of ativan given      Concerns for physician to address:       Zone phone for oncoming shift:   [phone]            Activity:  Activity Level: Bed Rest  Number times ambulated in hallways past shift: 0  Number of times OOB to chair past shift: 2     Cardiac:   Cardiac Monitoring: Yes      Cardiac Rhythm: Sinus [place]     Access:  Current line(s): PICC      Genitourinary:   Urinary status: voiding     Respiratory:   O2 Device: None (Room air)  Chronic home O2 use?: NO  Incentive spirometer at bedside: NO     GI:  Last Bowel Movement Date: 11/25/22  Current diet:  ADULT DIET Regular  Passing flatus: YES  Tolerating current diet: YES     Pain Management:   Patient states pain is manageable on current regimen: YES     Skin:  Bunny Score: 21  Interventions: increase time out of bed and nutritional support     Patient Safety:  Fall Score:  Total Score: 3  Interventions: pt to call before getting OOB     Length of Stay:  Expected LOS: - - -  Actual LOS: 2

## 2022-11-26 NOTE — ED NOTES
Bedside shift change report given to CHRISTIANNE Oconnell (oncoming nurse) by Estevan Rosa (offgoing nurse). Report included the following information SBAR, Kardex, ED Summary, and MAR.

## 2022-11-26 NOTE — ED NOTES
Bedside shift change report given to "[employer]ALBERT Oconnell (oncoming nurse) by Deondre Reyna (offgoing nurse). Report included the following information SBAR, Kardex, ED Summary, and MAR.

## 2022-11-26 NOTE — PROGRESS NOTES
rBe Serve to Dr. Terry Cardenas for transfer to lower level care. CT 24 hour post TNK is negative for hemorrhage.        Natalya French [facility]-BC     Critical Care Medicine  [facility]

## 2022-11-26 NOTE — PROGRESS NOTES
PHYSICAL THERAPY EVALUATION WITH DISCHARGE  Patient: Tha Resendiz (34 y.o. female)  Date: 11/26/2022  Primary Diagnosis: Stroke [facility]) [I63.9]       Precautions:          ASSESSMENT  Based on the objective data described below, the patient presents with reports of R and numbness and received TNK. Pt received supine in bed and agreeable to therapy. Brain MRI: 1. No acute findings. 2. Minimal atrophy and nonspecific white matter changes. 3. Incidental left frontal meningiomas. Pt very verbose, tangential and difficult to redirect during evaluation. Pt demonstrated intact and equal LE strength and sensation. Pt with equal  strength on assessment. Pt completed supine to sit and transfers independently without AD. Pt tolerated gait training x 250 ft with SBA at a fast pace with narrow BENTLEY and no LOB or instability noted. Pt appears to be mobilizing at baseline mobility status and does not require any further acute PT needs. Functional Outcome Measure: The patient scored Total: 54/56 on the Means Balance Assessment which is indicative of low fall risk. Other factors to consider for discharge: none     Further skilled acute physical therapy is not indicated at this time. PLAN :  Recommendation for discharge: (in order for the patient to meet his/her long term goals)  No skilled physical therapy/ follow up rehabilitation needs identified at this time. This discharge recommendation:  Has been made in collaboration with the attending provider and/or case management    IF patient discharges home will need the following DME: none         SUBJECTIVE:   Patient stated I flew on a jet to [place] first and then came down here for Thanksgiving with my son. We live in [place].     OBJECTIVE DATA SUMMARY:   HISTORY:    No past medical history on file. No past surgical history on file. Prior level of function: independent, active, lives with spouse in [place].  Is currently visiting family for thanksgiving  Personal factors and/or comorbidities impacting plan of care:     Home Situation  Home Environment: Private residence  One/Two Story Residence: One story  Living Alone: No  Support Systems: Spouse/Significant Other, Child(coleen)  Patient Expects to be Discharged to[de-identified] Home  Current DME Used/Available at Home: None    EXAMINATION/PRESENTATION/DECISION MAKING:   Critical Behavior:  Neurologic State: Alert  Orientation Level: Oriented X4  Cognition: Follows commands     Hearing: Auditory  Auditory Impairment: None  Skin:    Edema:   Range Of Motion:  AROM: Within functional limits                       Strength:    Strength:  Within functional limits                    Tone & Sensation:                  Sensation: Intact               Coordination:     Vision:      Functional Mobility:  Bed Mobility:     Supine to Sit: Independent     Scooting: Independent  Transfers:  Sit to Stand: Independent  Stand to Sit: Independent                       Balance:   Sitting: Intact  Standing: Intact  Ambulation/Gait Training:  Distance (ft): 250 Feet (ft)  Assistive Device: Gait belt  Ambulation - Level of Assistance: Supervision                 Base of Support: Narrowed     Speed/Tisha: Fluctuations  Step Length: Right shortened;Left shortened        Functional Measure  Means Balance Test:    Sitting to Standin  Standing Unsupported: 4  Sitting with Back Unsupported: 4  Standing to Sittin  Transfers: 4  Standing Unsupported with Eyes Closed: 4  Standing Unsupported with Feet Together: 4  Reach Forward with Outstretched Arm: 4   Object: 4  Turn to Look Over Shoulders: 4  Turn 360 Degrees: 4  Alternate Foot on Step/Stool: 4  Standing Unsupported One Foot in Front: 4  Stand on One Le  Total: 54/56         56=Maximum possible score;   0-20=High fall risk  21-40=Moderate fall risk   41-56=Low fall risk        Based on the above components, the patient evaluation is determined to be of the following complexity level: LOW     Pain Rating:  Pt denied pain    Activity Tolerance:   Good    After treatment patient left in no apparent distress:   Sitting in chair, Call bell within reach, and Side rails x 3    COMMUNICATION/EDUCATION:   The patients plan of care was discussed with: Occupational therapist and Registered nurse. Patient was educated regarding Her deficit(s) of RUE weakness (Resolved) as this relates to Her diagnosis of CVA r/o. She demonstrated Fair understanding    Patient and/or family was verbally educated on the BE FAST acronym for signs/symptoms of CVA and TIA. BE FAST was written on patient's communication board  for visual education and reinforcement. All questions answered with patient indicating fair understanding. Fall prevention education was provided and the patient/caregiver indicated understanding., Patient/family have participated as able in goal setting and plan of care. , and Patient/family agree to work toward stated goals and plan of care.     Thank you for this referral.  Rah Domínguez, PT, DPT   Time Calculation: 13 mins

## 2022-11-26 NOTE — PROGRESS NOTES
Malik of Shift Note    Bedside shift change report given to [facility] (oncoming nurse) by Darrius Dietz RN (offgoing nurse). Report included the following information SBAR, Kardex, Intake/Output, Recent Results, Cardiac Rhythm NSR, and Dual Neuro Assessment    Shift worked:  11-7   Shift summary and any significant changes:     A&O X4     Concerns for physician to address:       Zone phone for oncoming shift:   [phone]         Activity:  Activity Level: Bed Rest  Number times ambulated in hallways past shift: 0  Number of times OOB to chair past shift: 2    Cardiac:   Cardiac Monitoring: Yes      Cardiac Rhythm: Sinus [place]    Access:  Current line(s): PICC     Genitourinary:   Urinary status: voiding    Respiratory:   O2 Device: None (Room air)  Chronic home O2 use?: NO  Incentive spirometer at bedside: NO       GI:  Last Bowel Movement Date: 11/25/22  Current diet:  ADULT DIET Regular  Passing flatus: YES  Tolerating current diet: YES       Pain Management:   Patient states pain is manageable on current regimen: YES    Skin:  Bunny Score: 21  Interventions: increase time out of bed and nutritional support     Patient Safety:  Fall Score:  Total Score: 3  Interventions: pt to call before getting OOB       Length of Stay:  Expected LOS: - - -  Actual LOS: 2      Darrius Dietz RN

## 2022-11-26 NOTE — PROGRESS NOTES
Problem: Aspiration - Risk of  Goal: *Absence of aspiration  Outcome: Progressing Towards Goal     Problem: Patient Education: Go to Patient Education Activity  Goal: Patient/Family Education  Outcome: Progressing Towards Goal     Problem: Patient Education: Go to Patient Education Activity  Goal: Patient/Family Education  Outcome: Progressing Towards Goal

## 2022-11-27 VITALS
SYSTOLIC BLOOD PRESSURE: 167 MMHG | RESPIRATION RATE: 16 BRPM | WEIGHT: 113.98 LBS | HEART RATE: 55 BPM | HEIGHT: 60 IN | TEMPERATURE: 98.6 F | OXYGEN SATURATION: 98 % | BODY MASS INDEX: 22.38 KG/M2 | DIASTOLIC BLOOD PRESSURE: 85 MMHG

## 2022-11-27 LAB
ANION GAP SERPL CALC-SCNC: 7 MMOL/L (ref 5–15)
BASOPHILS # BLD: 0 K/UL (ref 0–0.1)
BASOPHILS NFR BLD: 1 % (ref 0–1)
BUN SERPL-MCNC: 11 MG/DL (ref 6–20)
BUN/CREAT SERPL: 15 (ref 12–20)
CALCIUM SERPL-MCNC: 9.1 MG/DL (ref 8.5–10.1)
CHLORIDE SERPL-SCNC: 97 MMOL/L (ref 97–108)
CO2 SERPL-SCNC: 26 MMOL/L (ref 21–32)
CREAT SERPL-MCNC: 0.75 MG/DL (ref 0.55–1.02)
DIFFERENTIAL METHOD BLD: ABNORMAL
ECHO LA DIAMETER INDEX: 2.38 CM/M2
ECHO LA DIAMETER: 3.5 CM
ECHO LV E' LATERAL VELOCITY: 4 CM/S
ECHO LV E' SEPTAL VELOCITY: 4 CM/S
ECHO LV FRACTIONAL SHORTENING: 36 % (ref 28–44)
ECHO LV INTERNAL DIMENSION DIASTOLE INDEX: 2.45 CM/M2
ECHO LV INTERNAL DIMENSION DIASTOLIC: 3.6 CM (ref 3.9–5.3)
ECHO LV INTERNAL DIMENSION SYSTOLIC INDEX: 1.56 CM/M2
ECHO LV INTERNAL DIMENSION SYSTOLIC: 2.3 CM
ECHO LV IVSD: 1 CM (ref 0.6–0.9)
ECHO LV MASS 2D: 115.9 G (ref 67–162)
ECHO LV MASS INDEX 2D: 78.8 G/M2 (ref 43–95)
ECHO LV POSTERIOR WALL DIASTOLIC: 1.1 CM (ref 0.6–0.9)
ECHO LV RELATIVE WALL THICKNESS RATIO: 0.61
ECHO MV A VELOCITY: 0.68 M/S
ECHO MV E DECELERATION TIME (DT): 319.7 MS
ECHO MV E VELOCITY: 0.6 M/S
ECHO MV E/A RATIO: 0.88
ECHO MV E/E' LATERAL: 15
ECHO MV E/E' RATIO (AVERAGED): 15
ECHO MV E/E' SEPTAL: 15
ECHO RV TAPSE: 2.9 CM (ref 1.7–?)
EOSINOPHIL # BLD: 0.2 K/UL (ref 0–0.4)
EOSINOPHIL NFR BLD: 4 % (ref 0–7)
ERYTHROCYTE [DISTWIDTH] IN BLOOD BY AUTOMATED COUNT: 12.3 % (ref 11.5–14.5)
GLUCOSE SERPL-MCNC: 91 MG/DL (ref 65–100)
HCT VFR BLD AUTO: 34.4 % (ref 35–47)
HGB BLD-MCNC: 12.1 G/DL (ref 11.5–16)
IMM GRANULOCYTES # BLD AUTO: 0 K/UL (ref 0–0.04)
IMM GRANULOCYTES NFR BLD AUTO: 0 % (ref 0–0.5)
LYMPHOCYTES # BLD: 2.1 K/UL (ref 0.8–3.5)
LYMPHOCYTES NFR BLD: 32 % (ref 12–49)
MAGNESIUM SERPL-MCNC: 1.9 MG/DL (ref 1.6–2.4)
MCH RBC QN AUTO: 31.8 PG (ref 26–34)
MCHC RBC AUTO-ENTMCNC: 35.2 G/DL (ref 30–36.5)
MCV RBC AUTO: 90.3 FL (ref 80–99)
MONOCYTES # BLD: 0.8 K/UL (ref 0–1)
MONOCYTES NFR BLD: 13 % (ref 5–13)
NEUTS SEG # BLD: 3.2 K/UL (ref 1.8–8)
NEUTS SEG NFR BLD: 50 % (ref 32–75)
NRBC # BLD: 0 K/UL (ref 0–0.01)
NRBC BLD-RTO: 0 PER 100 WBC
PHOSPHATE SERPL-MCNC: 4 MG/DL (ref 2.6–4.7)
PLATELET # BLD AUTO: 261 K/UL (ref 150–400)
PMV BLD AUTO: 9.7 FL (ref 8.9–12.9)
POTASSIUM SERPL-SCNC: 3.5 MMOL/L (ref 3.5–5.1)
RBC # BLD AUTO: 3.81 M/UL (ref 3.8–5.2)
SODIUM SERPL-SCNC: 130 MMOL/L (ref 136–145)
WBC # BLD AUTO: 6.4 K/UL (ref 3.6–11)

## 2022-11-27 PROCEDURE — 74011250637 HC RX REV CODE- 250/637: Performed by: NURSE PRACTITIONER

## 2022-11-27 PROCEDURE — 74011250636 HC RX REV CODE- 250/636

## 2022-11-27 PROCEDURE — 83735 ASSAY OF MAGNESIUM: CPT

## 2022-11-27 PROCEDURE — 85025 COMPLETE CBC W/AUTO DIFF WBC: CPT

## 2022-11-27 PROCEDURE — 36415 COLL VENOUS BLD VENIPUNCTURE: CPT

## 2022-11-27 PROCEDURE — 74011250637 HC RX REV CODE- 250/637: Performed by: INTERNAL MEDICINE

## 2022-11-27 PROCEDURE — 80048 BASIC METABOLIC PNL TOTAL CA: CPT

## 2022-11-27 PROCEDURE — 74011250636 HC RX REV CODE- 250/636: Performed by: STUDENT IN AN ORGANIZED HEALTH CARE EDUCATION/TRAINING PROGRAM

## 2022-11-27 PROCEDURE — 84100 ASSAY OF PHOSPHORUS: CPT

## 2022-11-27 RX ORDER — METOPROLOL TARTRATE 25 MG/1
25 TABLET, FILM COATED ORAL EVERY 12 HOURS
Qty: 60 TABLET | Refills: 0 | Status: SHIPPED | OUTPATIENT
Start: 2022-11-27 | End: 2022-12-27

## 2022-11-27 RX ORDER — LEVOTHYROXINE SODIUM 75 UG/1
37.5 TABLET ORAL
Qty: 15 TABLET | Refills: 0 | Status: SHIPPED | OUTPATIENT
Start: 2022-11-28 | End: 2022-12-28

## 2022-11-27 RX ORDER — LIOTHYRONINE SODIUM 5 UG/1
5 TABLET ORAL DAILY
Qty: 30 TABLET | Refills: 0 | Status: SHIPPED | OUTPATIENT
Start: 2022-11-28 | End: 2022-12-28

## 2022-11-27 RX ORDER — GUAIFENESIN 100 MG/5ML
81 LIQUID (ML) ORAL DAILY
Qty: 30 TABLET | Refills: 0 | Status: SHIPPED | OUTPATIENT
Start: 2022-11-28 | End: 2022-12-28

## 2022-11-27 RX ORDER — LISINOPRIL 20 MG/1
20 TABLET ORAL DAILY
Qty: 30 TABLET | Refills: 0 | Status: SHIPPED | OUTPATIENT
Start: 2022-11-28 | End: 2022-12-28

## 2022-11-27 RX ORDER — ATORVASTATIN CALCIUM 40 MG/1
40 TABLET, FILM COATED ORAL
Qty: 30 TABLET | Refills: 0 | Status: SHIPPED | OUTPATIENT
Start: 2022-11-27 | End: 2022-12-27

## 2022-11-27 RX ORDER — HYDROCHLOROTHIAZIDE 12.5 MG/1
12.5 TABLET ORAL DAILY
Qty: 30 TABLET | Refills: 0 | Status: SHIPPED | OUTPATIENT
Start: 2022-11-28 | End: 2022-12-28

## 2022-11-27 RX ADMIN — METOPROLOL TARTRATE 25 MG: 25 TABLET, FILM COATED ORAL at 09:21

## 2022-11-27 RX ADMIN — ASPIRIN 81 MG: 81 TABLET, CHEWABLE ORAL at 09:21

## 2022-11-27 RX ADMIN — LEVOTHYROXINE SODIUM 37.5 MCG: 0.07 TABLET ORAL at 06:34

## 2022-11-27 RX ADMIN — LIOTHYRONINE SODIUM 5 MCG: 5 TABLET ORAL at 09:21

## 2022-11-27 RX ADMIN — ENOXAPARIN SODIUM 30 MG: 100 INJECTION SUBCUTANEOUS at 09:21

## 2022-11-27 RX ADMIN — HYDROCHLOROTHIAZIDE 12.5 MG: 25 TABLET ORAL at 09:21

## 2022-11-27 RX ADMIN — LISINOPRIL 20 MG: 20 TABLET ORAL at 09:21

## 2022-11-27 NOTE — PROGRESS NOTES
Report given to day shift Nurse Brice Maldonado. Report included SBAR, MAR, KARDEX, LAB RESULTS, INTAKE/OUTPUT.

## 2022-11-27 NOTE — DISCHARGE SUMMARY
Hospitalist Discharge Summary     Patient ID:  Renetta Evans  [id number]  80 y.o.  1940  11/24/2022    PCP on record: None    Admit date: 11/24/2022  Discharge date and time: 11/27/2022    DISCHARGE DIAGNOSIS:    Aborted CVA post TNKase  Essential hypertension  Hypothyroidism  Anxiety disorder    CONSULTATIONS:  IP CONSULT TO NEUROLOGY    Excerpted HPI from H&P of Keyona Griffiths MD:  79 y/o with pmh of hypothyroidism and HTN presented for right upper extremity weakness started at 33 64 74. Upon arrival to the ED, weakness had resolved. Pt a little confused but admitted to having some wine. Stroke code activated,  CTH negative for bleed. CTA did not show a LVO. Tele neuro recommended TNK. However, /93. Given 10mg labetalol with improvement in BP and TNK given at 1820. ICU consulted for admission. ______________________________________________________________________  DISCHARGE SUMMARY/HOSPITAL COURSE:     Neurology consulted, greatly appreciate recommendations  -s/p TNKase  --Start aspirin 81 mg daily  --per neurology, does not need plavix as was not on antiplatlets prior to hosptial stay  -MRI brain negative for CVA   -A1c 5.1, , Start atorvastatin 40 mg daily-LFTs WNL 11/24  -On review of PCP office visit does not appear she carries diagnosis of hypothyroidism and patient is unsure of her medications  --TSH elevated at 21.7 agree with initiation of levothyroxine at 50 mcg daily  --Continue Lopressor 25 mg twice daily, PTA lisinopril-hydrochlorothiazide  -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 _______________________________________________________________________  Patient seen and examined by me on discharge day. Pertinent Findings:  Gen:    Not in distress, appears stated age  Chest: Clear lungs, no wheeze  CVS:   Regular rhythm. No edema  Abd:  Soft, not distended, not tender  Neuro:  Alert, no  gross deficits  _______________________________________________________________________  DISCHARGE MEDICATIONS:   Current Discharge Medication List        START taking these medications    Details   aspirin 81 mg chewable tablet Take 1 Tablet by mouth daily for 30 days. Qty: 30 Tablet, Refills: 0  Start date: 11/28/2022, End date: 12/28/2022      atorvastatin (LIPITOR) 40 mg tablet Take 1 Tablet by mouth nightly for 30 days. Qty: 30 Tablet, Refills: 0  Start date: 11/27/2022, End date: 12/27/2022      metoprolol tartrate (LOPRESSOR) 25 mg tablet Take 1 Tablet by mouth every twelve (12) hours for 30 days. Qty: 60 Tablet, Refills: 0  Start date: 11/27/2022, End date: 12/27/2022      lisinopriL (PRINIVIL, ZESTRIL) 20 mg tablet Take 1 Tablet by mouth daily for 30 days. Qty: 30 Tablet, Refills: 0  Start date: 11/28/2022, End date: 12/28/2022      hydroCHLOROthiazide (HYDRODIURIL) 12.5 mg tablet Take 1 Tablet by mouth daily for 30 days. Qty: 30 Tablet, Refills: 0  Start date: 11/28/2022, End date: 12/28/2022      levothyroxine (SYNTHROID) 75 mcg tablet Take 0.5 Tablets by mouth every morning for 30 days. Qty: 15 Tablet, Refills: 0  Start date: 11/28/2022, End date: 12/28/2022      liothyronine (CYTOMEL) 5 mcg tablet Take 1 Tablet by mouth daily for 30 days. Qty: 30 Tablet, Refills: 0  Start date: 11/28/2022, End date: 12/28/2022               Patient Follow Up Instructions: Activity: Activity as tolerated  Diet: Regular Diet    Follow-up with PCP, Neurology, Vascular surgery in 3 days.   Follow-up tests/labs none    Follow-up Information       Follow up With Specialties Details Why Contact Info    None None ([phone]) Patient stated that they have no PCP            ________________________________________________________________    Risk of deterioration: Low    Condition at Discharge:  Stable  __________________________________________________________________    Disposition  Home with family, no needs    ____________________________________________________________________    Code Status: Full Code  ___________________________________________________________________      Total time in minutes spent coordinating this discharge (includes going over instructions, follow-up, prescriptions, and preparing report for sign off to her PCP) :  >30 minutes    Signed:  Shmuel Aguilar MD

## 2022-11-27 NOTE — PROGRESS NOTES
Problem: Aspiration - Risk of  Goal: *Absence of aspiration  Outcome: Progressing Towards Goal     Problem: Patient Education: Go to Patient Education Activity  Goal: Patient/Family Education  Outcome: Progressing Towards Goal     Problem: Patient Education: Go to Patient Education Activity  Goal: Patient/Family Education  Outcome: Progressing Towards Goal     Problem: Falls - Risk of  Goal: *Absence of Falls  Description: Document Yazmin [place] Fall Risk and appropriate interventions in the flowsheet.   Outcome: Progressing Towards Goal  Note: Fall Risk Interventions:  Mobility Interventions: Bed/chair exit alarm         Medication Interventions: Bed/chair exit alarm         History of Falls Interventions: Bed/chair exit alarm         Problem: Patient Education: Go to Patient Education Activity  Goal: Patient/Family Education  Outcome: Progressing Towards Goal

## 2022-11-27 NOTE — PROGRESS NOTES
Transition of Care Plan  RUR: 8%  DISPOSITION: The disposition plan is returning home to [place], [place] with  via car   F/U with PCP/Specialist    Transport: family         Reason for Admission:  stroke                     RUR Score:   7%                Plan for utilizing home health:  no recommendations        PCP: First and Last name:  Is connected with PCP at home      Name of Practice:    Are you a current patient: Yes/No:    Approximate date of last visit:    Can you participate in a virtual visit with your PCP:                     Current Advanced Directive/Advance Care Plan: Full Code      Healthcare Decision Maker:   Click here to complete [address] including selection of the Healthcare Decision Maker Relationship (ie \"Primary\")                             Transition of Care Plan:                        CRM spoke with patient's , introduced self, explained role, verified demographics, and offered assistance. Patient lives with her . Patient is independent in her Adls/IADLs and has a bp machine and a foot warmer. Patient is connected with a PCP at home and plan is for  to drive home a day or so after patients discharge. Patient/ are encouraged to follow up with patient's PCP upon returning home. Care Management Interventions  PCP Verified by CM: Yes  Palliative Care Criteria Met (RRAT>21 & CHF Dx)?: No  Mode of Transport at Discharge:  Other (see comment) ()  Transition of Care Consult (CM Consult): Discharge Planning  MyChart Signup: No  Discharge Durable Medical Equipment: No  Health Maintenance Reviewed: Yes  Physical Therapy Consult: Yes  Occupational Therapy Consult: Yes  Speech Therapy Consult: No  Support Systems: Spouse/Significant Other  Confirm Follow Up Transport: Self  [place] Resource Information Provided?: No  Discharge Location  Patient Expects to be Discharged to[de-identified] Home with family assistance    8:51 AM  Sana Cochrans, PAUL

## 2022-11-27 NOTE — PROGRESS NOTES
Problem: Aspiration - Risk of  Goal: *Absence of aspiration  11/27/2022 1536 by Juan Pradhan RN  Outcome: Resolved/Met  11/27/2022 1249 by Juan Pradhan RN  Outcome: Progressing Towards Goal     Problem: Patient Education: Go to Patient Education Activity  Goal: Patient/Family Education  11/27/2022 1536 by Juan Pradhan RN  Outcome: Resolved/Met  11/27/2022 1249 by Juan Pradhan RN  Outcome: Progressing Towards Goal     Problem: Patient Education: Go to Patient Education Activity  Goal: Patient/Family Education  11/27/2022 1536 by Juan Pradhan RN  Outcome: Resolved/Met  11/27/2022 1249 by Juan Pradhan RN  Outcome: Progressing Towards Goal     Problem: Falls - Risk of  Goal: *Absence of Falls  Description: Document Rosemary Marking Fall Risk and appropriate interventions in the flowsheet.   11/27/2022 1536 by Juan Pradhan RN  Outcome: Resolved/Met  11/27/2022 1249 by Juan Pradhan RN  Outcome: Progressing Towards Goal  Note: Fall Risk Interventions:  Mobility Interventions: Bed/chair exit alarm         Medication Interventions: Bed/chair exit alarm         History of Falls Interventions: Bed/chair exit alarm         Problem: Patient Education: Go to Patient Education Activity  Goal: Patient/Family Education  11/27/2022 1536 by Juan Pradhan RN  Outcome: Resolved/Met  11/27/2022 1249 by Juan Pradhan RN  Outcome: Progressing Towards Goal

## 2022-11-27 NOTE — PROGRESS NOTES
Problem: Aspiration - Risk of  Goal: *Absence of aspiration  Outcome: Progressing Towards Goal     Problem: Falls - Risk of  Goal: *Absence of Falls  Description: Document Frankie Fall Risk and appropriate interventions in the flowsheet.   Outcome: Progressing Towards Goal  Note: Fall Risk Interventions:  Mobility Interventions: Bed/chair exit alarm         Medication Interventions: Bed/chair exit alarm         History of Falls Interventions: Bed/chair exit alarm